# Patient Record
Sex: FEMALE | Race: WHITE | NOT HISPANIC OR LATINO
[De-identification: names, ages, dates, MRNs, and addresses within clinical notes are randomized per-mention and may not be internally consistent; named-entity substitution may affect disease eponyms.]

---

## 2021-01-13 ENCOUNTER — APPOINTMENT (OUTPATIENT)
Dept: FAMILY MEDICINE | Facility: CLINIC | Age: 56
End: 2021-01-13
Payer: COMMERCIAL

## 2021-01-13 VITALS
BODY MASS INDEX: 43.43 KG/M2 | SYSTOLIC BLOOD PRESSURE: 122 MMHG | HEART RATE: 109 BPM | TEMPERATURE: 98 F | DIASTOLIC BLOOD PRESSURE: 80 MMHG | OXYGEN SATURATION: 100 % | WEIGHT: 230 LBS | HEIGHT: 61 IN

## 2021-01-13 DIAGNOSIS — Z80.3 FAMILY HISTORY OF MALIGNANT NEOPLASM OF BREAST: ICD-10-CM

## 2021-01-13 DIAGNOSIS — Z00.00 ENCOUNTER FOR GENERAL ADULT MEDICAL EXAMINATION W/OUT ABNORMAL FINDINGS: ICD-10-CM

## 2021-01-13 DIAGNOSIS — Z82.49 FAMILY HISTORY OF ISCHEMIC HEART DISEASE AND OTHER DISEASES OF THE CIRCULATORY SYSTEM: ICD-10-CM

## 2021-01-13 DIAGNOSIS — F41.9 ANXIETY DISORDER, UNSPECIFIED: ICD-10-CM

## 2021-01-13 DIAGNOSIS — Z23 ENCOUNTER FOR IMMUNIZATION: ICD-10-CM

## 2021-01-13 DIAGNOSIS — E55.9 VITAMIN D DEFICIENCY, UNSPECIFIED: ICD-10-CM

## 2021-01-13 DIAGNOSIS — I10 ESSENTIAL (PRIMARY) HYPERTENSION: ICD-10-CM

## 2021-01-13 DIAGNOSIS — R00.0 TACHYCARDIA, UNSPECIFIED: ICD-10-CM

## 2021-01-13 DIAGNOSIS — Z80.41 FAMILY HISTORY OF MALIGNANT NEOPLASM OF OVARY: ICD-10-CM

## 2021-01-13 PROCEDURE — 99072 ADDL SUPL MATRL&STAF TM PHE: CPT

## 2021-01-13 PROCEDURE — 99204 OFFICE O/P NEW MOD 45 MIN: CPT

## 2021-01-13 NOTE — PLAN
[FreeTextEntry1] : f/u dr. Cuenca, Gyn for uterine wall thickening and will go for mammogram for left breast pain . \par HTN: Lisinopril 10 mg daily. labs ordered.\par anxiety: coping stills, stress management. \par sinus tachycardia- last cardiology visit 2 years ago, pt. will return to office for EKG. limit caffeine.

## 2021-01-13 NOTE — HISTORY OF PRESENT ILLNESS
[FreeTextEntry8] : Here to establish. She has HTN, essential , well controlled with Lisinopril. She has anxiety and has been taking BP lowering med. for 4 years. She moved back to [place] from [place], [place]. PCP:  Gorge Reeves. \par She tries to meditate for anxiety. She denies chest pain, sob, fever, chills.

## 2021-01-13 NOTE — HEALTH RISK ASSESSMENT
[No] : In the past 12 months have you used drugs other than those required for medical reasons? No [No falls in past year] : Patient reported no falls in the past year [0] : 2) Feeling down, depressed, or hopeless: Not at all (0) [] : No [de-identified] : indoor gym  [de-identified] : regular [[id number]] : 0

## 2021-01-13 NOTE — PAST MEDICAL HISTORY
[Postmenopausal] : history of menopause having occurred [Total Preg ___] : G: [unfilled] [Live Births___] : P: [unfilled] [Full Term ___] : Full Term: [unfilled]  [Living ___] : Living: [unfilled]

## 2021-02-11 ENCOUNTER — APPOINTMENT (OUTPATIENT)
Dept: MAMMOGRAPHY | Facility: HOSPITAL | Age: 56
End: 2021-02-11
Payer: COMMERCIAL

## 2021-02-11 ENCOUNTER — OUTPATIENT (OUTPATIENT)
Dept: OUTPATIENT SERVICES | Facility: HOSPITAL | Age: 56
LOS: 1 days | End: 2021-02-11
Payer: COMMERCIAL

## 2021-02-11 ENCOUNTER — APPOINTMENT (OUTPATIENT)
Dept: ULTRASOUND IMAGING | Facility: HOSPITAL | Age: 56
End: 2021-02-11
Payer: COMMERCIAL

## 2021-02-11 DIAGNOSIS — Z00.8 ENCOUNTER FOR OTHER GENERAL EXAMINATION: ICD-10-CM

## 2021-02-11 PROCEDURE — 76641 ULTRASOUND BREAST COMPLETE: CPT

## 2021-02-11 PROCEDURE — 76830 TRANSVAGINAL US NON-OB: CPT | Mod: 26

## 2021-02-11 PROCEDURE — 76641 ULTRASOUND BREAST COMPLETE: CPT | Mod: 26,50

## 2021-02-11 PROCEDURE — 77063 BREAST TOMOSYNTHESIS BI: CPT | Mod: 26

## 2021-02-11 PROCEDURE — 77067 SCR MAMMO BI INCL CAD: CPT | Mod: 26

## 2021-02-11 PROCEDURE — 77067 SCR MAMMO BI INCL CAD: CPT

## 2021-02-11 PROCEDURE — 76830 TRANSVAGINAL US NON-OB: CPT

## 2021-02-11 PROCEDURE — 77063 BREAST TOMOSYNTHESIS BI: CPT

## 2021-02-12 LAB
25(OH)D3 SERPL-MCNC: 26.9 NG/ML
ABO + RH PNL BLD: NORMAL
ALBUMIN SERPL ELPH-MCNC: 4.4 G/DL
ALP BLD-CCNC: 63 U/L
ALT SERPL-CCNC: 25 U/L
ANION GAP SERPL CALC-SCNC: 14 MMOL/L
APPEARANCE: CLEAR
AST SERPL-CCNC: 21 U/L
BACTERIA: ABNORMAL
BASOPHILS # BLD AUTO: 0.06 K/UL
BASOPHILS NFR BLD AUTO: 1 %
BILIRUB SERPL-MCNC: 0.4 MG/DL
BILIRUBIN URINE: NEGATIVE
BLOOD URINE: NORMAL
BUN SERPL-MCNC: 20 MG/DL
CALCIUM SERPL-MCNC: 9.9 MG/DL
CHLORIDE SERPL-SCNC: 107 MMOL/L
CHOLEST SERPL-MCNC: 181 MG/DL
CO2 SERPL-SCNC: 21 MMOL/L
COLOR: NORMAL
CREAT SERPL-MCNC: 0.73 MG/DL
CREAT SPEC-SCNC: 79 MG/DL
EOSINOPHIL # BLD AUTO: 0.17 K/UL
EOSINOPHIL NFR BLD AUTO: 2.7 %
ESTIMATED AVERAGE GLUCOSE: 117 MG/DL
GLUCOSE QUALITATIVE U: NEGATIVE
GLUCOSE SERPL-MCNC: 86 MG/DL
HBA1C MFR BLD HPLC: 5.7 %
HCT VFR BLD CALC: 43.4 %
HDLC SERPL-MCNC: 60 MG/DL
HGB BLD-MCNC: 14.2 G/DL
HYALINE CASTS: 1 /LPF
IMM GRANULOCYTES NFR BLD AUTO: 0.2 %
KETONES URINE: NEGATIVE
LDLC SERPL CALC-MCNC: 101 MG/DL
LEUKOCYTE ESTERASE URINE: NEGATIVE
LYMPHOCYTES # BLD AUTO: 2.48 K/UL
LYMPHOCYTES NFR BLD AUTO: 39.8 %
MAN DIFF?: NORMAL
MCHC RBC-ENTMCNC: 30.3 PG
MCHC RBC-ENTMCNC: 32.7 GM/DL
MCV RBC AUTO: 92.5 FL
MICROALBUMIN 24H UR DL<=1MG/L-MCNC: 1.4 MG/DL
MICROALBUMIN/CREAT 24H UR-RTO: 17 MG/G
MICROSCOPIC-UA: NORMAL
MONOCYTES # BLD AUTO: 0.5 K/UL
MONOCYTES NFR BLD AUTO: 8 %
NEUTROPHILS # BLD AUTO: 3.01 K/UL
NEUTROPHILS NFR BLD AUTO: 48.3 %
NITRITE URINE: NEGATIVE
NONHDLC SERPL-MCNC: 121 MG/DL
PH URINE: 6
PLATELET # BLD AUTO: 336 K/UL
POTASSIUM SERPL-SCNC: 4.5 MMOL/L
PROT SERPL-MCNC: 7.4 G/DL
PROTEIN URINE: NEGATIVE
RBC # BLD: 4.69 M/UL
RBC # FLD: 12.6 %
RED BLOOD CELLS URINE: 3 /HPF
SODIUM SERPL-SCNC: 141 MMOL/L
SPECIFIC GRAVITY URINE: 1.02
SQUAMOUS EPITHELIAL CELLS: 4 /HPF
TRIGL SERPL-MCNC: 96 MG/DL
TSH SERPL-ACNC: 0.7 UIU/ML
UROBILINOGEN URINE: NORMAL
WBC # FLD AUTO: 6.23 K/UL
WHITE BLOOD CELLS URINE: 4 /HPF

## 2021-07-12 ENCOUNTER — RX RENEWAL (OUTPATIENT)
Age: 56
End: 2021-07-12

## 2021-08-16 ENCOUNTER — TRANSCRIPTION ENCOUNTER (OUTPATIENT)
Age: 56
End: 2021-08-16

## 2021-10-06 PROBLEM — I10 ESSENTIAL HYPERTENSION: Status: ACTIVE | Noted: 2021-01-13

## 2022-01-04 ENCOUNTER — TRANSCRIPTION ENCOUNTER (OUTPATIENT)
Age: 57
End: 2022-01-04

## 2022-01-10 ENCOUNTER — RX RENEWAL (OUTPATIENT)
Age: 57
End: 2022-01-10

## 2022-05-07 ENCOUNTER — INPATIENT (INPATIENT)
Facility: HOSPITAL | Age: 57
LOS: 2 days | Discharge: HOME | DRG: 418 | End: 2022-05-10
Attending: STUDENT IN AN ORGANIZED HEALTH CARE EDUCATION/TRAINING PROGRAM | Admitting: STUDENT IN AN ORGANIZED HEALTH CARE EDUCATION/TRAINING PROGRAM
Payer: COMMERCIAL

## 2022-05-07 VITALS
SYSTOLIC BLOOD PRESSURE: 174 MMHG | TEMPERATURE: 98 F | HEART RATE: 86 BPM | DIASTOLIC BLOOD PRESSURE: 92 MMHG | WEIGHT: 214.95 LBS | OXYGEN SATURATION: 98 % | RESPIRATION RATE: 18 BRPM

## 2022-05-07 DIAGNOSIS — K80.20 CALCULUS OF GALLBLADDER WITHOUT CHOLECYSTITIS WITHOUT OBSTRUCTION: ICD-10-CM

## 2022-05-07 DIAGNOSIS — Z98.891 HISTORY OF UTERINE SCAR FROM PREVIOUS SURGERY: Chronic | ICD-10-CM

## 2022-05-07 LAB
ALBUMIN SERPL ELPH-MCNC: 3.9 G/DL — SIGNIFICANT CHANGE UP (ref 3.3–5)
ALP SERPL-CCNC: 81 U/L — SIGNIFICANT CHANGE UP (ref 40–120)
ALT FLD-CCNC: 36 U/L — SIGNIFICANT CHANGE UP (ref 10–45)
ANION GAP SERPL CALC-SCNC: 11 MMOL/L — SIGNIFICANT CHANGE UP (ref 5–17)
APPEARANCE UR: CLEAR — SIGNIFICANT CHANGE UP
APTT BLD: 31.7 SEC — SIGNIFICANT CHANGE UP (ref 27.5–35.5)
AST SERPL-CCNC: 28 U/L — SIGNIFICANT CHANGE UP (ref 10–40)
BACTERIA # UR AUTO: NEGATIVE /HPF — SIGNIFICANT CHANGE UP
BASOPHILS # BLD AUTO: 0.06 K/UL — SIGNIFICANT CHANGE UP (ref 0–0.2)
BASOPHILS NFR BLD AUTO: 0.7 % — SIGNIFICANT CHANGE UP (ref 0–2)
BILIRUB SERPL-MCNC: 0.4 MG/DL — SIGNIFICANT CHANGE UP (ref 0.2–1.2)
BILIRUB UR-MCNC: NEGATIVE — SIGNIFICANT CHANGE UP
BLD GP AB SCN SERPL QL: SIGNIFICANT CHANGE UP
BUN SERPL-MCNC: 21 MG/DL — SIGNIFICANT CHANGE UP (ref 7–23)
CALCIUM SERPL-MCNC: 10.4 MG/DL — SIGNIFICANT CHANGE UP (ref 8.4–10.5)
CHLORIDE SERPL-SCNC: 106 MMOL/L — SIGNIFICANT CHANGE UP (ref 96–108)
CO2 SERPL-SCNC: 22 MMOL/L — SIGNIFICANT CHANGE UP (ref 22–31)
COLOR SPEC: YELLOW — SIGNIFICANT CHANGE UP
CREAT SERPL-MCNC: 0.64 MG/DL — SIGNIFICANT CHANGE UP (ref 0.5–1.3)
D DIMER BLD IA.RAPID-MCNC: 160 NG/ML DDU — SIGNIFICANT CHANGE UP
DIFF PNL FLD: ABNORMAL
EGFR: 102 ML/MIN/1.73M2 — SIGNIFICANT CHANGE UP
EOSINOPHIL # BLD AUTO: 0.14 K/UL — SIGNIFICANT CHANGE UP (ref 0–0.5)
EOSINOPHIL NFR BLD AUTO: 1.6 % — SIGNIFICANT CHANGE UP (ref 0–6)
EPI CELLS # UR: SIGNIFICANT CHANGE UP
GLUCOSE SERPL-MCNC: 141 MG/DL — HIGH (ref 70–99)
GLUCOSE UR QL: NEGATIVE — SIGNIFICANT CHANGE UP
HCT VFR BLD CALC: 42.4 % — SIGNIFICANT CHANGE UP (ref 34.5–45)
HGB BLD-MCNC: 14.2 G/DL — SIGNIFICANT CHANGE UP (ref 11.5–15.5)
IMM GRANULOCYTES NFR BLD AUTO: 0.6 % — SIGNIFICANT CHANGE UP (ref 0–1.5)
INR BLD: 0.97 RATIO — SIGNIFICANT CHANGE UP (ref 0.88–1.16)
KETONES UR-MCNC: NEGATIVE — SIGNIFICANT CHANGE UP
LACTATE SERPL-SCNC: 1.3 MMOL/L — SIGNIFICANT CHANGE UP (ref 0.7–2)
LEUKOCYTE ESTERASE UR-ACNC: NEGATIVE — SIGNIFICANT CHANGE UP
LIDOCAIN IGE QN: 118 U/L — SIGNIFICANT CHANGE UP (ref 73–393)
LYMPHOCYTES # BLD AUTO: 2.83 K/UL — SIGNIFICANT CHANGE UP (ref 1–3.3)
LYMPHOCYTES # BLD AUTO: 31.4 % — SIGNIFICANT CHANGE UP (ref 13–44)
MCHC RBC-ENTMCNC: 31.1 PG — SIGNIFICANT CHANGE UP (ref 27–34)
MCHC RBC-ENTMCNC: 33.5 GM/DL — SIGNIFICANT CHANGE UP (ref 32–36)
MCV RBC AUTO: 92.8 FL — SIGNIFICANT CHANGE UP (ref 80–100)
MONOCYTES # BLD AUTO: 0.82 K/UL — SIGNIFICANT CHANGE UP (ref 0–0.9)
MONOCYTES NFR BLD AUTO: 9.1 % — SIGNIFICANT CHANGE UP (ref 2–14)
NEUTROPHILS # BLD AUTO: 5.12 K/UL — SIGNIFICANT CHANGE UP (ref 1.8–7.4)
NEUTROPHILS NFR BLD AUTO: 56.6 % — SIGNIFICANT CHANGE UP (ref 43–77)
NITRITE UR-MCNC: NEGATIVE — SIGNIFICANT CHANGE UP
NRBC # BLD: 0 /100 WBCS — SIGNIFICANT CHANGE UP (ref 0–0)
PH UR: 7 — SIGNIFICANT CHANGE UP (ref 5–8)
PLATELET # BLD AUTO: 366 K/UL — SIGNIFICANT CHANGE UP (ref 150–400)
POTASSIUM SERPL-MCNC: 4.7 MMOL/L — SIGNIFICANT CHANGE UP (ref 3.5–5.3)
POTASSIUM SERPL-SCNC: 4.7 MMOL/L — SIGNIFICANT CHANGE UP (ref 3.5–5.3)
PROT SERPL-MCNC: 8.2 G/DL — SIGNIFICANT CHANGE UP (ref 6–8.3)
PROT UR-MCNC: 15
PROTHROM AB SERPL-ACNC: 11.2 SEC — SIGNIFICANT CHANGE UP (ref 10.5–13.4)
RBC # BLD: 4.57 M/UL — SIGNIFICANT CHANGE UP (ref 3.8–5.2)
RBC # FLD: 12.8 % — SIGNIFICANT CHANGE UP (ref 10.3–14.5)
RBC CASTS # UR COMP ASSIST: SIGNIFICANT CHANGE UP /HPF (ref 0–4)
SARS-COV-2 RNA SPEC QL NAA+PROBE: SIGNIFICANT CHANGE UP
SODIUM SERPL-SCNC: 139 MMOL/L — SIGNIFICANT CHANGE UP (ref 135–145)
SP GR SPEC: 1.01 — SIGNIFICANT CHANGE UP (ref 1.01–1.02)
TROPONIN I, HIGH SENSITIVITY RESULT: 7.9 NG/L — SIGNIFICANT CHANGE UP
TROPONIN I, HIGH SENSITIVITY RESULT: 9 NG/L — SIGNIFICANT CHANGE UP
UROBILINOGEN FLD QL: NEGATIVE — SIGNIFICANT CHANGE UP
WBC # BLD: 9.02 K/UL — SIGNIFICANT CHANGE UP (ref 3.8–10.5)
WBC # FLD AUTO: 9.02 K/UL — SIGNIFICANT CHANGE UP (ref 3.8–10.5)
WBC UR QL: NEGATIVE /HPF — SIGNIFICANT CHANGE UP (ref 0–5)

## 2022-05-07 PROCEDURE — 99285 EMERGENCY DEPT VISIT HI MDM: CPT

## 2022-05-07 PROCEDURE — 71045 X-RAY EXAM CHEST 1 VIEW: CPT | Mod: 26

## 2022-05-07 PROCEDURE — 99223 1ST HOSP IP/OBS HIGH 75: CPT

## 2022-05-07 PROCEDURE — 76705 ECHO EXAM OF ABDOMEN: CPT | Mod: 26

## 2022-05-07 PROCEDURE — 74176 CT ABD & PELVIS W/O CONTRAST: CPT | Mod: 26,MA

## 2022-05-07 RX ORDER — SODIUM CHLORIDE 9 MG/ML
1000 INJECTION INTRAMUSCULAR; INTRAVENOUS; SUBCUTANEOUS
Refills: 0 | Status: DISCONTINUED | OUTPATIENT
Start: 2022-05-07 | End: 2022-05-09

## 2022-05-07 RX ORDER — METRONIDAZOLE 500 MG
500 TABLET ORAL EVERY 8 HOURS
Refills: 0 | Status: DISCONTINUED | OUTPATIENT
Start: 2022-05-07 | End: 2022-05-09

## 2022-05-07 RX ORDER — ONDANSETRON 8 MG/1
4 TABLET, FILM COATED ORAL EVERY 6 HOURS
Refills: 0 | Status: DISCONTINUED | OUTPATIENT
Start: 2022-05-07 | End: 2022-05-09

## 2022-05-07 RX ORDER — FAMOTIDINE 10 MG/ML
20 INJECTION INTRAVENOUS ONCE
Refills: 0 | Status: COMPLETED | OUTPATIENT
Start: 2022-05-07 | End: 2022-05-07

## 2022-05-07 RX ORDER — ONDANSETRON 8 MG/1
4 TABLET, FILM COATED ORAL ONCE
Refills: 0 | Status: COMPLETED | OUTPATIENT
Start: 2022-05-07 | End: 2022-05-07

## 2022-05-07 RX ORDER — CIPROFLOXACIN LACTATE 400MG/40ML
400 VIAL (ML) INTRAVENOUS EVERY 12 HOURS
Refills: 0 | Status: DISCONTINUED | OUTPATIENT
Start: 2022-05-07 | End: 2022-05-09

## 2022-05-07 RX ORDER — LANOLIN ALCOHOL/MO/W.PET/CERES
3 CREAM (GRAM) TOPICAL AT BEDTIME
Refills: 0 | Status: DISCONTINUED | OUTPATIENT
Start: 2022-05-07 | End: 2022-05-09

## 2022-05-07 RX ORDER — MORPHINE SULFATE 50 MG/1
4 CAPSULE, EXTENDED RELEASE ORAL ONCE
Refills: 0 | Status: DISCONTINUED | OUTPATIENT
Start: 2022-05-07 | End: 2022-05-07

## 2022-05-07 RX ORDER — SODIUM CHLORIDE 9 MG/ML
1000 INJECTION INTRAMUSCULAR; INTRAVENOUS; SUBCUTANEOUS
Refills: 0 | Status: DISCONTINUED | OUTPATIENT
Start: 2022-05-07 | End: 2022-05-07

## 2022-05-07 RX ORDER — FAMOTIDINE 10 MG/ML
1 INJECTION INTRAVENOUS
Qty: 20 | Refills: 0
Start: 2022-05-07 | End: 2022-05-16

## 2022-05-07 RX ORDER — CIPROFLOXACIN LACTATE 400MG/40ML
400 VIAL (ML) INTRAVENOUS ONCE
Refills: 0 | Status: COMPLETED | OUTPATIENT
Start: 2022-05-07 | End: 2022-05-07

## 2022-05-07 RX ORDER — PANTOPRAZOLE SODIUM 20 MG/1
40 TABLET, DELAYED RELEASE ORAL
Refills: 0 | Status: DISCONTINUED | OUTPATIENT
Start: 2022-05-07 | End: 2022-05-09

## 2022-05-07 RX ORDER — METRONIDAZOLE 500 MG
500 TABLET ORAL ONCE
Refills: 0 | Status: DISCONTINUED | OUTPATIENT
Start: 2022-05-07 | End: 2022-05-09

## 2022-05-07 RX ORDER — ONDANSETRON 8 MG/1
1 TABLET, FILM COATED ORAL
Qty: 30 | Refills: 0
Start: 2022-05-07 | End: 2022-05-16

## 2022-05-07 RX ORDER — LISINOPRIL 2.5 MG/1
10 TABLET ORAL DAILY
Refills: 0 | Status: DISCONTINUED | OUTPATIENT
Start: 2022-05-07 | End: 2022-05-09

## 2022-05-07 RX ORDER — METOCLOPRAMIDE HCL 10 MG
10 TABLET ORAL ONCE
Refills: 0 | Status: COMPLETED | OUTPATIENT
Start: 2022-05-07 | End: 2022-05-07

## 2022-05-07 RX ORDER — LISINOPRIL 2.5 MG/1
1 TABLET ORAL
Qty: 0 | Refills: 0 | DISCHARGE

## 2022-05-07 RX ORDER — ACETAMINOPHEN 500 MG
650 TABLET ORAL EVERY 6 HOURS
Refills: 0 | Status: DISCONTINUED | OUTPATIENT
Start: 2022-05-07 | End: 2022-05-09

## 2022-05-07 RX ADMIN — ONDANSETRON 4 MILLIGRAM(S): 8 TABLET, FILM COATED ORAL at 13:02

## 2022-05-07 RX ADMIN — MORPHINE SULFATE 4 MILLIGRAM(S): 50 CAPSULE, EXTENDED RELEASE ORAL at 05:55

## 2022-05-07 RX ADMIN — Medication 650 MILLIGRAM(S): at 20:30

## 2022-05-07 RX ADMIN — SODIUM CHLORIDE 75 MILLILITER(S): 9 INJECTION INTRAMUSCULAR; INTRAVENOUS; SUBCUTANEOUS at 21:04

## 2022-05-07 RX ADMIN — MORPHINE SULFATE 4 MILLIGRAM(S): 50 CAPSULE, EXTENDED RELEASE ORAL at 07:10

## 2022-05-07 RX ADMIN — Medication 10 MILLIGRAM(S): at 13:18

## 2022-05-07 RX ADMIN — Medication 100 MILLIGRAM(S): at 13:52

## 2022-05-07 RX ADMIN — LISINOPRIL 10 MILLIGRAM(S): 2.5 TABLET ORAL at 14:45

## 2022-05-07 RX ADMIN — FAMOTIDINE 20 MILLIGRAM(S): 10 INJECTION INTRAVENOUS at 06:30

## 2022-05-07 RX ADMIN — Medication 200 MILLIGRAM(S): at 17:37

## 2022-05-07 RX ADMIN — Medication 200 MILLIGRAM(S): at 12:23

## 2022-05-07 RX ADMIN — Medication 100 MILLIGRAM(S): at 21:08

## 2022-05-07 RX ADMIN — SODIUM CHLORIDE 75 MILLILITER(S): 9 INJECTION INTRAMUSCULAR; INTRAVENOUS; SUBCUTANEOUS at 12:23

## 2022-05-07 RX ADMIN — PANTOPRAZOLE SODIUM 40 MILLIGRAM(S): 20 TABLET, DELAYED RELEASE ORAL at 14:45

## 2022-05-07 RX ADMIN — FAMOTIDINE 100 MILLIGRAM(S): 10 INJECTION INTRAVENOUS at 06:00

## 2022-05-07 RX ADMIN — Medication 650 MILLIGRAM(S): at 20:01

## 2022-05-07 RX ADMIN — ONDANSETRON 4 MILLIGRAM(S): 8 TABLET, FILM COATED ORAL at 05:55

## 2022-05-07 NOTE — H&P ADULT - NSHPSOCIALHISTORY_GEN_ALL_CORE
Lives with daughter and son in law  Ambulates without assistance, performs ADLs independently   Drives, uses stairs  Works as a   Denies smoking, alcohol use, illicit drug use      Family hx:  Mother  age 82 hx of CVA, CAD  Father  age 56 hx of MI

## 2022-05-07 NOTE — ED PROVIDER NOTE - PHYSICAL EXAMINATION
General:     NAD, well-nourished, well-appearing  Head:     NC/AT, EOMI, oral mucosa moist  Neck:     trachea midline  Lungs:     CTA b/l, no w/r/r  CVS:     S1S2, RRR, no m/g/r  Abd:     +BS, s/ + epigastric , preiumblical tenderness /nd, no organomegaly  Ext:    2+ radial and pedal pulses, no c/c/e  Neuro: AAOx3, no sensory/motor deficits

## 2022-05-07 NOTE — H&P ADULT - NSHPREVIEWOFSYSTEMS_GEN_ALL_CORE
CONSTITUTIONAL: No fever, No fatigue  EYES: No eye pain, visual disturbances, or discharge  ENMT:   No ear pain, No nose bleed; No sinus or throat pain  NECK: No pain or stiffness  RESPIRATORY: No cough, wheezing, or hemoptysis; No shortness of breath  CARDIOVASCULAR: No chest pain, palpitations, or leg swelling  GASTROINTESTINAL: positive abdominal pain, positive nausea, positive vomiting, No diarrhea or constipation.   GENITOURINARY: No dysuria, frequency, hematuria   NEUROLOGICAL: No headaches, No loss of strength, numbness; No dizziness  SKIN: No itching, burning  ENDOCRINE: No heat or cold intolerance; No hair loss  MUSCULOSKELETAL: No joint pain or swelling; No muscle or extremity pain  PSYCHIATRIC: No depression, anxiety   HEME/LYMPH: No easy bruising, or bleeding gums  ALLERGY AND IMMUNOLOGIC: No hives or eczema

## 2022-05-07 NOTE — PATIENT PROFILE ADULT - FALL HARM RISK - UNIVERSAL INTERVENTIONS
Bed in lowest position, wheels locked, appropriate side rails in place/Call bell, personal items and telephone in reach/Instruct patient to call for assistance before getting out of bed or chair/Non-slip footwear when patient is out of bed/Alpha to call system/Physically safe environment - no spills, clutter or unnecessary equipment/Purposeful Proactive Rounding/Room/bathroom lighting operational, light cord in reach

## 2022-05-07 NOTE — ED PROVIDER NOTE - CLINICAL SUMMARY MEDICAL DECISION MAKING FREE TEXT BOX
56 y f cc upper abd pain associated with tinge of blood once no chest pain, no dizziness , no sweating pain constant started last night hx of htn, no known hx of cad or gall bladder dz  + epigastric , preiumblical tenderness   labs nl including trops , d dimer  ekg sinus tach   ct abd + gall stones gall bladder wall edema -plan abd sono r/o cholecystitis  pt fell berret with pepcid zofran and morphine  7.am case s/o dr thomas ed physician follow up gb sono id neg dc follow up surgery sono tech called in

## 2022-05-07 NOTE — ED PROVIDER NOTE - CARE PROVIDER_API CALL
Rashad Barajas)  Surgery  [address] [facility] [address] [facility] [address], [address]  [place] [address]  Phone: [phone]  Fax: [phone]  Follow Up Time:

## 2022-05-07 NOTE — ED ADULT NURSE NOTE - OBJECTIVE STATEMENT
56yr old female walked into ED c/o epigastric pain since yesterday 9pm; pt vomited x1 this am. Pt ate pizza last night

## 2022-05-07 NOTE — CHART NOTE - NSCHARTNOTEFT_GEN_A_CORE
Called by ED team. Reviewed images and they are consistent with acute calculous cholecystitis.    plan is to admit  iv fluids  iv antibioitics  clear liquid diet    will assess early tomorrow am but I will add on to OR schedule for laparoscopic cholecystectomy for Monday(unless she has worsening picture tomorrow then ill add on for sunday)    please keep npo past midnight tonight just in case.      thank you    dr rai rivera  cell#[phone]

## 2022-05-07 NOTE — H&P ADULT - NSHPPHYSICALEXAM_GEN_ALL_CORE
Vital Signs Last 24 Hrs  T(C): 37.1 (07 May 2022 12:54), Max: 37.1 (07 May 2022 12:54)  T(F): 98.8 (07 May 2022 12:54), Max: 98.8 (07 May 2022 12:54)  HR: 100 (07 May 2022 12:54) (72 - 100)  BP: 123/88 (07 May 2022 12:54) (123/88 - 174/92)  BP(mean): 100 (07 May 2022 12:54) (100 - 100)  RR: 18 (07 May 2022 12:54) (18 - 18)  SpO2: 98% (07 May 2022 12:54) (98% - 98%) - RA    PHYSICAL EXAM:  GENERAL: NAD, well-groomed  HEAD:  AT/NC   EYES: EOMI, PERRL, conjunctiva and sclera clear  ENMT: No tonsillar erythema, exudates, or enlargement; Moist mucous membranes, Good dentition, No lesions  NECK: Supple, No JVD  NERVOUS SYSTEM:  Alert & Oriented X3, Good concentration; Motor Strength 5/5 B/L upper and lower extremities  CHEST/LUNG: Clear to percussion bilaterally; No rales, rhonchi, wheezing, or rubs  HEART: Regular rate and rhythm; No murmurs, rubs, or gallops  ABDOMEN: Soft, tender to deep palpation in RUQ, Nondistended; Bowel sounds present  EXTREMITIES:  2+ Peripheral Pulses, No clubbing, cyanosis. trace bilateral lower extremity edema  LYMPH: No lymphadenopathy noted  SKIN: No rashes or lesions; tattoos

## 2022-05-07 NOTE — H&P ADULT - ASSESSMENT
#Acute cholecystitis   -Admit to medicine  -s/p IV Cipro and Flagyl in the ED. Continue IV Cipro and Flagyl   -Start clear liquid diet, NPO after midnight tonight incase of worsening clinical status  -Start IVF NS 75cc/hr   -IV Zofran PRN for n/v  -Continue Protonix  -Discussed with surgery Dr Barajas, plan for OR on Monday for lap steven (or Sunday if clinical status is worsening)    #Tachycardia  likely related to above  -PO hydration encouraged  -Monitor vitals    #HTN  -Continue Lisinopril  -Monitor vitals    #Prophylactic Measure  -DVT ppx: SCDs, hold chemical ppx for OR  56F with PMH HTN, anxiety presents to the ED with epigastric pain and vomiting admitted for acute cholecystitis.     #Acute cholecystitis   -Admit to medicine  -s/p IV Cipro and Flagyl in the ED. Continue IV Cipro and Flagyl   -Start clear liquid diet, NPO after midnight tonight incase of worsening clinical status  -Start IVF NS 75cc/hr   -IV Zofran PRN for n/v  -Start Protonix  -Discussed with surgery Dr Barajas, plan for OR on Monday for lap steven (or Sunday if clinical status is worsening)    #Tachycardia  likely related to above  -Continue gentle IV fluids, PO hydration encouraged  -Monitor vitals    #HTN  -Continue Lisinopril  -Monitor vitals    #Prophylactic Measure  -DVT ppx: SCDs, hold chemical ppx for OR     Patient states she will update family members, declines I call daughter. 56F with PMH HTN, anxiety presents to the ED with epigastric pain and vomiting admitted for acute cholecystitis.     #Acute cholecystitis with cholelithiasis   US GB showed Gallbladder is filled with stones and sludge with mild wall thickening.   -Admit to medicine  -s/p IV Cipro and Flagyl in the ED. Continue IV Cipro and Flagyl   -Start clear liquid diet, NPO after midnight tonight incase of worsening clinical status  -Start IVF NS 75cc/hr   -IV Zofran PRN for n/v  -Start Protonix  -Discussed with surgery Dr Barajas, plan for OR on Monday for lap steven (or Sunday if clinical status is worsening)    #Tachycardia  likely related to above  -Continue gentle IV fluids, PO hydration encouraged  -Monitor vitals    #HTN  -Continue Lisinopril  -Monitor vitals    #Prophylactic Measure  -DVT ppx: SCDs, hold chemical ppx for OR     Patient states she will update family members, declines I call daughter.

## 2022-05-07 NOTE — ED PROVIDER NOTE - OBJECTIVE STATEMENT
56 y f cc upper abd pain associated with tinge of blood once no chest pain, no dizziness , no sweating pain constant started last night hx of htn, no known hx of cad or gall bladder dz

## 2022-05-07 NOTE — H&P ADULT - HISTORY OF PRESENT ILLNESS
In the ED: temp 97.7, HR 86, /92, RR 18, Spo2 98% RA  Received IV Cipro, Flagyl, 1L NS bolus, Pepcid, Morphine, Zofran in the ED.  EKG personally reviewed: sinus tachycardia 111   Chest x-ray personally reviewed: no acute infiltrate   CT abd: Cholelithiasis. Suggestion of mild gallbladder wall edema.  No acute bowel inflammatory changes or obstruction. Detailed evaluation distal GI tract is limited secondary to inadequate distention. Mild diverticulosis coli.  RUQ US: Gallbladder is filled with stones and sludge with mild wall thickening.  Torres's sign is unreliable. Findings are equivocal for acute cholecystitis. No biliary dilatation. Fatty liver  Evaluated by surgery Dr Barajas, recommending admission and possible OR for lap steven on Monday.  56F with PMH HTN, anxiety presents to the ED with epigastric pain and vomiting. Reports 10/10 RUQ pain radiating to LUQ, associated with nausea and one episode of vomiting. States this happened before on Monday but abd pain symptoms subsided on their own. Reports having pizza for dinner last night and several hours later noted sharp pain that would not subside prompting ED evaluation. Denies chest pain, palpitations, SOB. Denies fever, chills.    In the ED: temp 97.7, HR 86, /92, RR 18, Spo2 98% RA  Received IV Cipro, Flagyl, 1L NS bolus, Pepcid, Morphine, Zofran in the ED.  EKG personally reviewed: sinus tachycardia 111   Chest x-ray personally reviewed: no acute infiltrate   CT abd: Cholelithiasis. Suggestion of mild gallbladder wall edema.  No acute bowel inflammatory changes or obstruction. Detailed evaluation distal GI tract is limited secondary to inadequate distention. Mild diverticulosis coli.  RUQ US: Gallbladder is filled with stones and sludge with mild wall thickening.  Torres's sign is unreliable. Findings are equivocal for acute cholecystitis. No biliary dilatation. Fatty liver  Evaluated by surgery Dr Barajas, recommending admission and possible OR for lap steven on Monday.

## 2022-05-07 NOTE — ED PROVIDER NOTE - PROGRESS NOTE DETAILS
Mae FERRARI: Received s/o from Dr. Gooden; patient is a 57 yo female with upper abdominal pain; intermittent x 5 days; worse after eating pizza last night with vomiting; patient is feeling BETTER at this time; CT with gallstones; RUQ ultrasound ordered r/o cholecystitis; patient made aware of plan of care at this time; will continue to re-eval Mae DO: US findings equivocal for acute cholecystitis; ultrasound reviewed with Dr. Barajas- recommend admission for surgery; IV cipro and flagyl; preop labs ordered; covid; endorsed to Dr. Gomez for admission. Dr. Barajas to come see patient and patient made aware of plan of care at this time.

## 2022-05-07 NOTE — ED ADULT TRIAGE NOTE - CHIEF COMPLAINT QUOTE
Patient presents to ED from home complaining of epigastric pain intermittently since Monday, patient vomited bile flecked with blood this morning so she came to the ED. Patient states she has been under a lot of stress. Patient denies chest pain. Patient normally takes lisinopril daily but did not take it yesterday or today.

## 2022-05-08 ENCOUNTER — TRANSCRIPTION ENCOUNTER (OUTPATIENT)
Age: 57
End: 2022-05-08

## 2022-05-08 LAB
ALBUMIN SERPL ELPH-MCNC: 3.2 G/DL — LOW (ref 3.3–5)
ALP SERPL-CCNC: 51 U/L — SIGNIFICANT CHANGE UP (ref 40–120)
ALT FLD-CCNC: 25 U/L — SIGNIFICANT CHANGE UP (ref 10–45)
ANION GAP SERPL CALC-SCNC: 7 MMOL/L — SIGNIFICANT CHANGE UP (ref 5–17)
AST SERPL-CCNC: 15 U/L — SIGNIFICANT CHANGE UP (ref 10–40)
BILIRUB SERPL-MCNC: 0.4 MG/DL — SIGNIFICANT CHANGE UP (ref 0.2–1.2)
BUN SERPL-MCNC: 13 MG/DL — SIGNIFICANT CHANGE UP (ref 7–23)
CALCIUM SERPL-MCNC: 9.2 MG/DL — SIGNIFICANT CHANGE UP (ref 8.4–10.5)
CHLORIDE SERPL-SCNC: 109 MMOL/L — HIGH (ref 96–108)
CO2 SERPL-SCNC: 24 MMOL/L — SIGNIFICANT CHANGE UP (ref 22–31)
CREAT SERPL-MCNC: 0.69 MG/DL — SIGNIFICANT CHANGE UP (ref 0.5–1.3)
EGFR: 100 ML/MIN/1.73M2 — SIGNIFICANT CHANGE UP
GLUCOSE SERPL-MCNC: 102 MG/DL — HIGH (ref 70–99)
HCG SERPL-ACNC: <1 MIU/ML — SIGNIFICANT CHANGE UP
HCT VFR BLD CALC: 38.2 % — SIGNIFICANT CHANGE UP (ref 34.5–45)
HCV AB S/CO SERPL IA: 0.11 S/CO — SIGNIFICANT CHANGE UP (ref 0–0.99)
HCV AB SERPL-IMP: SIGNIFICANT CHANGE UP
HGB BLD-MCNC: 12.5 G/DL — SIGNIFICANT CHANGE UP (ref 11.5–15.5)
MCHC RBC-ENTMCNC: 30.9 PG — SIGNIFICANT CHANGE UP (ref 27–34)
MCHC RBC-ENTMCNC: 32.7 GM/DL — SIGNIFICANT CHANGE UP (ref 32–36)
MCV RBC AUTO: 94.6 FL — SIGNIFICANT CHANGE UP (ref 80–100)
NRBC # BLD: 0 /100 WBCS — SIGNIFICANT CHANGE UP (ref 0–0)
PLATELET # BLD AUTO: 282 K/UL — SIGNIFICANT CHANGE UP (ref 150–400)
POTASSIUM SERPL-MCNC: 3.8 MMOL/L — SIGNIFICANT CHANGE UP (ref 3.5–5.3)
POTASSIUM SERPL-SCNC: 3.8 MMOL/L — SIGNIFICANT CHANGE UP (ref 3.5–5.3)
PROT SERPL-MCNC: 6.8 G/DL — SIGNIFICANT CHANGE UP (ref 6–8.3)
RBC # BLD: 4.04 M/UL — SIGNIFICANT CHANGE UP (ref 3.8–5.2)
RBC # FLD: 12.9 % — SIGNIFICANT CHANGE UP (ref 10.3–14.5)
SODIUM SERPL-SCNC: 140 MMOL/L — SIGNIFICANT CHANGE UP (ref 135–145)
WBC # BLD: 7.61 K/UL — SIGNIFICANT CHANGE UP (ref 3.8–10.5)
WBC # FLD AUTO: 7.61 K/UL — SIGNIFICANT CHANGE UP (ref 3.8–10.5)

## 2022-05-08 PROCEDURE — 99233 SBSQ HOSP IP/OBS HIGH 50: CPT

## 2022-05-08 RX ADMIN — PANTOPRAZOLE SODIUM 40 MILLIGRAM(S): 20 TABLET, DELAYED RELEASE ORAL at 06:14

## 2022-05-08 RX ADMIN — SODIUM CHLORIDE 75 MILLILITER(S): 9 INJECTION INTRAMUSCULAR; INTRAVENOUS; SUBCUTANEOUS at 17:05

## 2022-05-08 RX ADMIN — Medication 100 MILLIGRAM(S): at 21:38

## 2022-05-08 RX ADMIN — Medication 100 MILLIGRAM(S): at 05:13

## 2022-05-08 RX ADMIN — LISINOPRIL 10 MILLIGRAM(S): 2.5 TABLET ORAL at 06:14

## 2022-05-08 RX ADMIN — Medication 100 MILLIGRAM(S): at 14:31

## 2022-05-08 RX ADMIN — Medication 650 MILLIGRAM(S): at 02:43

## 2022-05-08 RX ADMIN — Medication 200 MILLIGRAM(S): at 06:13

## 2022-05-08 RX ADMIN — Medication 200 MILLIGRAM(S): at 17:05

## 2022-05-08 RX ADMIN — Medication 650 MILLIGRAM(S): at 02:23

## 2022-05-08 NOTE — CONSULT NOTE ADULT - SUBJECTIVE AND OBJECTIVE BOX
Hospitalist note:    Reason for Admission: acute steven  History of Present Illness:   56F with PMH HTN, anxiety presents to the ED with epigastric pain and vomiting. Reports 10/10 RUQ pain radiating to LUQ, associated with nausea and one episode of vomiting. States this happened before on Monday but abd pain symptoms subsided on their own. Reports having pizza for dinner last night and several hours later noted sharp pain that would not subside prompting ED evaluation. Denies chest pain, palpitations, SOB. Denies fever, chills.    In the ED: temp 97.7, HR 86, /92, RR 18, Spo2 98% RA  Received IV Cipro, Flagyl, 1L NS bolus, Pepcid, Morphine, Zofran in the ED.  EKG personally reviewed: sinus tachycardia 111   Chest x-ray personally reviewed: no acute infiltrate   CT abd: Cholelithiasis. Suggestion of mild gallbladder wall edema.  No acute bowel inflammatory changes or obstruction. Detailed evaluation distal GI tract is limited secondary to inadequate distention. Mild diverticulosis coli.  RUQ US: Gallbladder is filled with stones and sludge with mild wall thickening.  Torres's sign is unreliable. Findings are equivocal for acute cholecystitis. No biliary dilatation. Fatty liver  Evaluated by surgery Dr Barajas, recommending admission and possible OR for lap steven on Monday.               patient interviewed and examined in geriatrics room 215      feels much better now      afebrile    Heent-sclera anicteric    abdomen-soft, non distended, non tender    labs:  Complete Blood Count in AM (05.08.22 @ 06:00)   Nucleated RBC: 0 /100 WBCs   WBC Count: 7.61 K/uL   RBC Count: 4.04 M/uL   Hemoglobin: 12.5 g/dL   Hematocrit: 38.2 %   Mean Cell Volume: 94.6 fl   Mean Cell Hemoglobin: 30.9 pg   Mean Cell Hemoglobin Conc: 32.7 gm/dL   Red Cell Distrib Width: 12.9 %   Platelet Count - Automated: 282 K/uL     k+3.8    creat 0.69    LFTs normal      imaging(I remotely reviewed last evening):  < from: US Abdomen Limited (05.07.22 @ 09:51) >    Gallbladder is filled with stones and sludge with mild wall thickening.   Torres's sign is unreliable. Findings are equivocal for acute   cholecystitis.  No biliary dilatation.    < end of copied text >  < from: CT Abdomen and Pelvis No Cont (05.07.22 @ 06:37) >  Cholelithiasis. Suggestion of mild gallbladder wall edema. Consider   correlation right upper quadrant ultrasound to exclude gallbladder   disease.    < end of copied text >

## 2022-05-08 NOTE — CONSULT NOTE ADULT - ASSESSMENT
a/p    symptomatic cholelithiasis    no worsening of symtoms today and afebriel, normal wbc, normal diff    I put on Operating Room add on schedule for Monday May 9, 2022 for laparoscopic cholectstectomy.  Procedure risks and benefits explained to patient.    May have full liquid diet today, NPO past midnight tonight.    Please encourage ambulation in hallway.    Thank you.    Dr. Rashad Barajas  cell#[phone]       How Severe Are Your Spot(S)?: mild Have Your Spot(S) Been Treated In The Past?: has not been treated Hpi Title: Evaluation of Skin Lesions Year Removed: 1900

## 2022-05-09 ENCOUNTER — RESULT REVIEW (OUTPATIENT)
Age: 57
End: 2022-05-09

## 2022-05-09 LAB
-  STAPHYLOCOCCUS EPIDERMIDIS: SIGNIFICANT CHANGE UP
ALBUMIN SERPL ELPH-MCNC: 3.2 G/DL — LOW (ref 3.3–5)
ALP SERPL-CCNC: 50 U/L — SIGNIFICANT CHANGE UP (ref 40–120)
ALT FLD-CCNC: 29 U/L — SIGNIFICANT CHANGE UP (ref 10–45)
ANION GAP SERPL CALC-SCNC: 8 MMOL/L — SIGNIFICANT CHANGE UP (ref 5–17)
AST SERPL-CCNC: 23 U/L — SIGNIFICANT CHANGE UP (ref 10–40)
BILIRUB SERPL-MCNC: 0.4 MG/DL — SIGNIFICANT CHANGE UP (ref 0.2–1.2)
BUN SERPL-MCNC: 12 MG/DL — SIGNIFICANT CHANGE UP (ref 7–23)
CALCIUM SERPL-MCNC: 8.9 MG/DL — SIGNIFICANT CHANGE UP (ref 8.4–10.5)
CHLORIDE SERPL-SCNC: 110 MMOL/L — HIGH (ref 96–108)
CO2 SERPL-SCNC: 24 MMOL/L — SIGNIFICANT CHANGE UP (ref 22–31)
CREAT SERPL-MCNC: 0.78 MG/DL — SIGNIFICANT CHANGE UP (ref 0.5–1.3)
EGFR: 88 ML/MIN/1.73M2 — SIGNIFICANT CHANGE UP
GLUCOSE SERPL-MCNC: 110 MG/DL — HIGH (ref 70–99)
GRAM STN FLD: SIGNIFICANT CHANGE UP
HCT VFR BLD CALC: 38.8 % — SIGNIFICANT CHANGE UP (ref 34.5–45)
HGB BLD-MCNC: 13.1 G/DL — SIGNIFICANT CHANGE UP (ref 11.5–15.5)
MAGNESIUM SERPL-MCNC: 1.9 MG/DL — SIGNIFICANT CHANGE UP (ref 1.6–2.6)
MCHC RBC-ENTMCNC: 31.3 PG — SIGNIFICANT CHANGE UP (ref 27–34)
MCHC RBC-ENTMCNC: 33.8 GM/DL — SIGNIFICANT CHANGE UP (ref 32–36)
MCV RBC AUTO: 92.8 FL — SIGNIFICANT CHANGE UP (ref 80–100)
METHOD TYPE: SIGNIFICANT CHANGE UP
NRBC # BLD: 0 /100 WBCS — SIGNIFICANT CHANGE UP (ref 0–0)
PLATELET # BLD AUTO: 257 K/UL — SIGNIFICANT CHANGE UP (ref 150–400)
POTASSIUM SERPL-MCNC: 4 MMOL/L — SIGNIFICANT CHANGE UP (ref 3.5–5.3)
POTASSIUM SERPL-SCNC: 4 MMOL/L — SIGNIFICANT CHANGE UP (ref 3.5–5.3)
PROT SERPL-MCNC: 6.7 G/DL — SIGNIFICANT CHANGE UP (ref 6–8.3)
RBC # BLD: 4.18 M/UL — SIGNIFICANT CHANGE UP (ref 3.8–5.2)
RBC # FLD: 12.9 % — SIGNIFICANT CHANGE UP (ref 10.3–14.5)
SODIUM SERPL-SCNC: 142 MMOL/L — SIGNIFICANT CHANGE UP (ref 135–145)
WBC # BLD: 6.69 K/UL — SIGNIFICANT CHANGE UP (ref 3.8–10.5)
WBC # FLD AUTO: 6.69 K/UL — SIGNIFICANT CHANGE UP (ref 3.8–10.5)

## 2022-05-09 PROCEDURE — 88304 TISSUE EXAM BY PATHOLOGIST: CPT | Mod: 26

## 2022-05-09 PROCEDURE — 47562 LAPAROSCOPIC CHOLECYSTECTOMY: CPT

## 2022-05-09 PROCEDURE — 99233 SBSQ HOSP IP/OBS HIGH 50: CPT

## 2022-05-09 DEVICE — HEMOSTAT ARISTA 3GR: Type: IMPLANTABLE DEVICE | Status: FUNCTIONAL

## 2022-05-09 DEVICE — CLIP APPLIER COVIDIEN ENDOCLIP 10MM LARGE: Type: IMPLANTABLE DEVICE | Status: FUNCTIONAL

## 2022-05-09 DEVICE — CLIP APPLIER ETHICON LIGACLIP 11.5" MEDIUM: Type: IMPLANTABLE DEVICE | Status: FUNCTIONAL

## 2022-05-09 DEVICE — CLIP APPLIER COVIDIEN ENDOCLIP 5MM: Type: IMPLANTABLE DEVICE | Status: FUNCTIONAL

## 2022-05-09 DEVICE — CLIP APPLIER COVIDIEN ENDOCLIP II 10MM MED/LG: Type: IMPLANTABLE DEVICE | Status: FUNCTIONAL

## 2022-05-09 DEVICE — TUBE T SILICONE 12FR: Type: IMPLANTABLE DEVICE | Status: FUNCTIONAL

## 2022-05-09 RX ORDER — HEPARIN SODIUM 5000 [USP'U]/ML
5000 INJECTION INTRAVENOUS; SUBCUTANEOUS EVERY 12 HOURS
Refills: 0 | Status: DISCONTINUED | OUTPATIENT
Start: 2022-05-09 | End: 2022-05-10

## 2022-05-09 RX ORDER — OXYCODONE HYDROCHLORIDE 5 MG/1
5 TABLET ORAL EVERY 6 HOURS
Refills: 0 | Status: DISCONTINUED | OUTPATIENT
Start: 2022-05-09 | End: 2022-05-10

## 2022-05-09 RX ORDER — CIPROFLOXACIN LACTATE 400MG/40ML
400 VIAL (ML) INTRAVENOUS ONCE
Refills: 0 | Status: COMPLETED | OUTPATIENT
Start: 2022-05-09 | End: 2022-05-09

## 2022-05-09 RX ORDER — SODIUM CHLORIDE 9 MG/ML
1000 INJECTION, SOLUTION INTRAVENOUS
Refills: 0 | Status: DISCONTINUED | OUTPATIENT
Start: 2022-05-09 | End: 2022-05-10

## 2022-05-09 RX ORDER — HYDROMORPHONE HYDROCHLORIDE 2 MG/ML
0.5 INJECTION INTRAMUSCULAR; INTRAVENOUS; SUBCUTANEOUS
Refills: 0 | Status: DISCONTINUED | OUTPATIENT
Start: 2022-05-09 | End: 2022-05-09

## 2022-05-09 RX ORDER — LANOLIN ALCOHOL/MO/W.PET/CERES
3 CREAM (GRAM) TOPICAL AT BEDTIME
Refills: 0 | Status: DISCONTINUED | OUTPATIENT
Start: 2022-05-09 | End: 2022-05-10

## 2022-05-09 RX ORDER — ACETAMINOPHEN 500 MG
650 TABLET ORAL EVERY 6 HOURS
Refills: 0 | Status: DISCONTINUED | OUTPATIENT
Start: 2022-05-09 | End: 2022-05-10

## 2022-05-09 RX ORDER — ONDANSETRON 8 MG/1
4 TABLET, FILM COATED ORAL EVERY 6 HOURS
Refills: 0 | Status: DISCONTINUED | OUTPATIENT
Start: 2022-05-09 | End: 2022-05-10

## 2022-05-09 RX ORDER — LISINOPRIL 2.5 MG/1
10 TABLET ORAL DAILY
Refills: 0 | Status: DISCONTINUED | OUTPATIENT
Start: 2022-05-09 | End: 2022-05-10

## 2022-05-09 RX ORDER — HEPARIN SODIUM 5000 [USP'U]/ML
5000 INJECTION INTRAVENOUS; SUBCUTANEOUS EVERY 12 HOURS
Refills: 0 | Status: DISCONTINUED | OUTPATIENT
Start: 2022-05-09 | End: 2022-05-09

## 2022-05-09 RX ORDER — HYDROMORPHONE HYDROCHLORIDE 2 MG/ML
0.25 INJECTION INTRAMUSCULAR; INTRAVENOUS; SUBCUTANEOUS EVERY 4 HOURS
Refills: 0 | Status: DISCONTINUED | OUTPATIENT
Start: 2022-05-09 | End: 2022-05-10

## 2022-05-09 RX ORDER — PANTOPRAZOLE SODIUM 20 MG/1
40 TABLET, DELAYED RELEASE ORAL
Refills: 0 | Status: DISCONTINUED | OUTPATIENT
Start: 2022-05-09 | End: 2022-05-10

## 2022-05-09 RX ORDER — METRONIDAZOLE 500 MG
500 TABLET ORAL ONCE
Refills: 0 | Status: COMPLETED | OUTPATIENT
Start: 2022-05-09 | End: 2022-05-09

## 2022-05-09 RX ORDER — ACETAMINOPHEN 500 MG
650 TABLET ORAL EVERY 6 HOURS
Refills: 0 | Status: DISCONTINUED | OUTPATIENT
Start: 2022-05-09 | End: 2022-05-09

## 2022-05-09 RX ORDER — SODIUM CHLORIDE 9 MG/ML
1000 INJECTION, SOLUTION INTRAVENOUS
Refills: 0 | Status: DISCONTINUED | OUTPATIENT
Start: 2022-05-09 | End: 2022-05-09

## 2022-05-09 RX ORDER — ONDANSETRON 8 MG/1
4 TABLET, FILM COATED ORAL ONCE
Refills: 0 | Status: DISCONTINUED | OUTPATIENT
Start: 2022-05-09 | End: 2022-05-09

## 2022-05-09 RX ADMIN — Medication 100 MILLIGRAM(S): at 05:39

## 2022-05-09 RX ADMIN — Medication 200 MILLIGRAM(S): at 07:13

## 2022-05-09 RX ADMIN — HYDROMORPHONE HYDROCHLORIDE 0.25 MILLIGRAM(S): 2 INJECTION INTRAMUSCULAR; INTRAVENOUS; SUBCUTANEOUS at 21:45

## 2022-05-09 RX ADMIN — Medication 200 MILLIGRAM(S): at 21:21

## 2022-05-09 RX ADMIN — HYDROMORPHONE HYDROCHLORIDE 0.5 MILLIGRAM(S): 2 INJECTION INTRAMUSCULAR; INTRAVENOUS; SUBCUTANEOUS at 18:10

## 2022-05-09 RX ADMIN — HYDROMORPHONE HYDROCHLORIDE 0.25 MILLIGRAM(S): 2 INJECTION INTRAMUSCULAR; INTRAVENOUS; SUBCUTANEOUS at 21:26

## 2022-05-09 RX ADMIN — HYDROMORPHONE HYDROCHLORIDE 0.5 MILLIGRAM(S): 2 INJECTION INTRAMUSCULAR; INTRAVENOUS; SUBCUTANEOUS at 17:55

## 2022-05-09 RX ADMIN — PANTOPRAZOLE SODIUM 40 MILLIGRAM(S): 20 TABLET, DELAYED RELEASE ORAL at 06:51

## 2022-05-09 RX ADMIN — Medication 100 MILLIGRAM(S): at 21:21

## 2022-05-09 RX ADMIN — LISINOPRIL 10 MILLIGRAM(S): 2.5 TABLET ORAL at 05:39

## 2022-05-09 NOTE — DIETITIAN INITIAL EVALUATION ADULT - OTHER INFO
pt. admitted w/ calculus of gallbaldder. scheduled for lap. steven. today. denies any n/v/ dairrhea. last bm was 5/6 ( lack of food). no edema noted. weight stable (obese).  has been tolerating the full liquid diet well. Skin intact. advance diet when medically appropriate to clear liquids, then low fat. advised weight loss. literature given. understanding is good. understands the need for weight loss. advised exercise as well. contact info. provided.

## 2022-05-09 NOTE — DIETITIAN NUTRITION RISK NOTIFICATION - TREATMENT: THE FOLLOWING DIET HAS BEEN RECOMMENDED
Diet, NPO after Midnight:      NPO Start Date: 08-May-2022,   NPO Start Time: 23:59 (05-08-22 @ 07:21) [Active]

## 2022-05-09 NOTE — PRE-OP CHECKLIST - HEIGHT IN INCHES
CARDIOLOGY TRANSFER OF CARE    Transfer of Care Date:  9/20/2021    Type of Transfer of Care:  Cardiology    Requesting Physician:  Dr. Brooks     Chief Complaint:  preop eval     History of Present Illness:  Patient is a 78 year old male with past medical history of CAD s/p CABG x 2, with LIMA to LAD and SVG to OM2 on 10/27/15, s/p PTA x2 to LM in 10/2019, ishemic cardiomyopathy with normalized EF per SARAY 11/2020 persistant AF s/p DCCV 2017, hypertension, COPD and hyperlipidemia who was admitted on 9/18/2021 with abdominal pain found to have SBO. ECG showing SR with PVCs and LBBB and troponin POC  <0.10    Pt states this morning he is feeling well. Tolerating CL diet. Denies any new or worsening chest pain or SOB. Able to climb one flight of stairs but is winded by the time he gets to the top which is not new.       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   • Chronic anticoagulation    • Chronic kidney disease    • Chronic pain     back pain   • COPD (chronic obstructive pulmonary disease) (CMS/HCC) 11/15/2012   • Coronary atherosclerosis of native coronary artery     Port   • Esophageal reflux    • Essential hypertension, benign    • Gout, unspecified    • HFrEF (heart failure with reduced ejection fraction) (CMS/[facility])    • Left bundle branch block (LBBB)    • Lumbar radiculopathy     s/p lumbar fusion    • Mixed hyperlipidemia    • Obese    • VALARIE (obstructive sleep apnea)    • Osteoarthrosis, unspecified whether generalized or localized, lower leg 3/6/2014    DDD knee   • Personal history of colonic polyps    • Retinal detachment     right eye   • S/P CABG x 2 10/27/2015   • Sinusitis, chronic    • SOBOE (shortness of breath on exertion) 7/2015    hosp. with dyspnea   • Unspecified hypothyroidism    • Wears  glasses    • Wears partial dentures     upper plate   • Wears partial dentures      Past Surgical History:   Procedure Laterality Date   • Cabg, artery-vein, single  10/27/15   • Cardiac catherization  10/7/15   • Cardiac event monitor  04/10-15/2016    6 day ACT monitor   • Colonoscopy  3/10/15   • Colonoscopy remove lesion by snare  11-    adenoma polyp, Dr Wilson f/u 5 yrs   • Echo limited  10/29/2015    LV EF- 40%   • Echo limited  01/27/2016    LV EF- 44%   • Echo m-mode/2d/doppler (routine)  12/12/2011    LVEF 45%   • Echo mady or  10/27/2015   • Eye surgery      retinal  tear surgery right eye   • Hand finger surgery unlisted      thumb pin   • Hand finger surgery unlisted      repair hand tendon   • Hernia repair      inguinal hernia as infant   • Laminectomy,lumbar  2002; 2010    and fusion, [facility] Yolande   • Removal of sperm duct(s)     • Stress test  12/12/2011, 9/15/15   • Tonsillectomy and adenoidectomy     • Total knee replacement Right 09/30/2014    Dr. Gutierrez. [facility]   • Total knee replacement Left 2005     ALLERGIES:   Allergen Reactions   • Cat Dander SHORTNESS OF BREATH     Watery eyes   • Dust Other (See Comments)     Watery eyes   • Grass Other (See Comments)     Watery eyes   • Trees Other (See Comments)     Watery eyes     Prior to Admission medications    Medication Sig Start Date End Date Taking? Authorizing Provider   lidocaine (Aspercreme Lidocaine) 4 % patch Place 1 patch onto the skin every 24 hours as needed for Pain.   Yes Outside Provider   losartan (COZAAR) 25 MG tablet TAKE 1 TABLET BY MOUTH  DAILY 8/31/21  Yes Dre Odom MD   clopidogrel (PLAVIX) 75 MG tablet TAKE 1 TABLET BY MOUTH  DAILY 8/31/21  Yes Dre Odom MD   metoPROLOL succinate (TOPROL-XL) 25 MG 24 hr tablet TAKE 1 TABLET BY MOUTH IN  THE MORNING AND 2 TABLETS  BY MOUTH IN THE EVENING 8/31/21  Yes Dre Odom MD   gabapentin (NEURONTIN) 100 MG capsule Take 1 capsule by mouth at bedtime.  Patient taking  differently: Take 100 mg by mouth at bedtime as needed. Indications: pain  8/24/21  Yes Eladio JIMENEZ MD   furosemide (LASIX) 20 MG tablet Take 0.5 tablets by mouth daily.  Patient taking differently: Take 10 mg by mouth daily as needed. Indications: Edema  8/3/21  Yes Dre Odom MD   levothyroxine 100 MCG tablet TAKE 1 TABLET BY MOUTH  DAILY 5/20/21  Yes Dora Sofia MD   simvastatin (ZOCOR) 10 MG tablet TAKE 1 TABLET BY MOUTH AT  NIGHT 5/20/21  Yes Dora Sofia MD   terazosin (HYTRIN) 1 MG capsule Sig: TAKE 2 CAPSULES NIGHTLY 5/5/21  Yes Dre Odom MD   pantoprazole (PROTONIX) 40 MG tablet TAKE 1 TABLET BY MOUTH  DAILY 4/26/21  Yes Dre Odom MD   allopurinol (ZYLOPRIM) 300 MG tablet Take 0.5 tablets by mouth daily. 4/22/21  Yes Dre Odom MD   fluticasone (FLONASE) 50 MCG/ACT nasal spray Use 1 spray in each nostril daily as needed for allergies  Patient taking differently: Spray 1 spray in each nostril daily.  4/22/21  Yes Dre Odom MD   nitroGLYcerin (NITROSTAT) 0.4 MG sublingual tablet Place 1 tablet under the tongue every 5 minutes as needed for Chest pain. 10/22/20  Yes Nader Rehman MD   fluticasone-salmeterol (ADVAIR, WIXELA) 250-50 MCG/DOSE inhaler Inhale 1 puff into the lungs two times daily.   Yes Outside Provider   acetaminophen (TYLENOL) 500 MG tablet Take 500 mg by mouth every 6 hours as needed for Pain.   Yes Outside Provider   albuterol 108 (90 Base) MCG/ACT inhaler Inhale 2 puffs into the lungs every 4 hours as needed for Shortness of Breath.  7/21/19  Yes Outside Provider   diclofenac (VOLTAREN) 1 % gel Apply 4 g topically 4 times daily. Apply to the hips as needed. 8/7/19  Yes Xavier Gutierrez MD   Multiple Vitamins-Minerals (MULTIVITAMIN ADULT) Tab Take 1 tablet by mouth daily (at noon).    Yes Outside Provider   Coenzyme Q10 (CO Q 10) 100 MG CAPS Take 1 tablet by mouth daily.     Yes Outside Provider   vitamin E 1000 UNIT capsule Take 1,000 Units by  mouth daily (at noon).    Yes Outside Provider   Cholecalciferol (VITAMIN D3) 1000 UNIT tablet Take 1,000-2,000 Units by mouth daily (at noon). Dose: 1-2 tablets (=1,000-2,000 units)   Yes Outside Provider   baclofen (LIORESAL) 10 MG tablet Take 0.5 tablets by mouth 3 times daily as needed (muscle spams). 21   Eladio JIMENEZ MD   finasteride (PROSCAR) 5 MG tablet Take 5 mg by mouth as needed. Indications: Benign Enlargement of Prostate    Outside Provider   fexofenadine (ALLEGRA) 180 MG tablet Take 180 mg by mouth as needed.  21  Outside Provider     Social History     Tobacco Use   • Smoking status: Former Smoker     Packs/day: 0.80     Years: 30.00     Pack years: 24.00     Types: Cigarettes     Quit date: 1987     Years since quittin.7   • Smokeless tobacco: Never Used   Substance Use Topics   • Alcohol use: Yes     Alcohol/week: 12.0 standard drinks     Types: 10 Cans of beer, 2 Standard drinks or equivalent per week     Comment: daily   beer/ dayo  2 drinks/night     Family History   Problem Relation Age of Onset   • Other Mother         age 82 old age   • Arthritis Mother    • Cancer Mother    • Alcohol Abuse Father    • Heart Father         age 41   • Diabetes Brother    • Hearing Loss Brother        Review of Systems:  10 systems reviewed and otherwise negative except for what is stated in past medical history and HPI (history of present illness).    Physical Exam:   Vitals:    21 0338   BP: (!) 148/84   Pulse: 85   Resp: 18   Temp: 98.4 °F (36.9 °C)       General Appearance:  alert  Eyes:  Anicteric Sclerae  Mouth:  Moist Mucous Membranes  Heart:  regular rate and rhythm  Lungs:  clear to auscultation bilaterally  Abdomen:  soft, non-tender; bowel sounds normal; no masses,  no organomegaly  Extremities:  no edema, redness or tenderness in the calves or thighs  Pulses:  +2 Bilateral Dorsalis Pedis  Musculoskeletal:  No tenderness over chest wall  Skin:  Warm and dry, no  rashes  Neuro:  Moves all 4 extremities equally, follows directions and answers questions appropriately  Psych:  Normal mood and affect    Data Review:    Lab Results   Component Value Date    SODIUM 127 (L) 09/20/2021    POTASSIUM 4.3 09/20/2021    BUN 19 09/20/2021    CREATININE 1.47 (H) 09/20/2021    WBC 9.1 09/20/2021    HCT 35.0 (L) 09/20/2021    HGB 11.8 (L) 09/20/2021     09/20/2021    INR 3.5 05/02/2016    RAPDTR 0.06 (HH) 10/08/2019    PTT 27 10/08/2019    GLUCOSE 111 (H) 09/20/2021    TSH 1.009 08/18/2021     (H) 06/05/2017    CHOLESTEROL 153 08/18/2021    HDL 90 08/18/2021    CALCLDL 52 08/18/2021    TRIGLYCERIDE 53 08/18/2021    MG 1.4 (L) 09/20/2021       Electrocardiogram:    SR with PVCs     Imaging:  SARAY 12/2020  1. Left ventricle is upper limit of normal in size with apical/septal  dyskinesia and otherwise normal wall motion. LVEF is 50%  2. Abnormal left ventricular diastolic function  3. Normal right ventricular size and systolic function  4. No previous study for comparison    ECHO 11/2020  Mildly increased left ventricular wall thickness. Left ventricular ejection fraction, 35 %.  Moderate global left ventricular hypokinesis. Abnormal septal motion consistent with conduction abnormality.  Moderately increased left atrial volume 39.6 ml/m².  Mild mitral valve regurgitation.  Mild tricuspid valve regurgitation. Right ventricular systolic pressure 30.9 mmHg.  No significant change since the prior study dated 03/20/2019.    [facility] 2019  1. Severe distal left main stenosis with unprotected large diagonal prior to LIMA anastomosis               -iFR positive at 0.80  2. Successful bifurcation stenting of distal left main.               -pre-dilation with a 3.5mm cutting balloon and a 4.5mm NC balloon (LM into LAD)               -Double barrel kissing technique used                            -3.0x22mm Resolute KEV placed extending into proximal LAD, final kiss post dilated with a 3.0 NC  balloon                            -2.5x15mm Resolute KEV placed extending into proximal LCx, final kiss post dilated with a 2.5 NC balloon  3. See Dr. Parisi report for full diagnostic study.  -ASA/Plavix    Assessment and Plan:  Cardiac risk assessment for possible SBO  - RCRI 0.9% risk of adverse cardiac events   - Denies any anginal equivalent sx. Able to perform 4 METS with SOB towards the end  - ECG without ischemic changes on admit. SARAY 12/2020 showing EF of 50%. ECHO ordered by primary team  - Plavix on hold for potential surgery. Start bASA, OK'd by surgical team. Continue BB, increase to 50 mg BID.     CAD s/p CABG 2015 and s/p balloon angioplasty x2 to left main 2019  - As above.     Ischemic Cardiomyopathy with low normal EF   - No overt HF on exam. Takes Lasix PRN PTA    Persistant atrial fibrillation s/p DCCV 2017  - Maintaining SR. Continue BB      Hypertension  - Elevated. ARB on hold 2/2 hyperkalemia. Increase BB     Hyperlipidemia   - Continue statin     Will discuss with Dr. Rehman.     SABRINA Calero/Dr. Rehman   Cardiology  Pager:  [phone]  9/20/2021  I have seen and examined the patient and I have discussed the care with my nurse practitioner and with the patient and I agree with the above content.  He is clinically stable with no cardiac symptoms. BB inceased due to persistent Htn. Pre-op risk assessment suggests he is at mildly increased cardiac and/or stroke risk and mildy increased 30day post op cardiac mortality.   At this point, he may not need surgery. Does NOT need an echo at this time. We have increased his BB due to persistent Htn and will follow    1

## 2022-05-09 NOTE — PROVIDER CONTACT NOTE (CRITICAL VALUE NOTIFICATION) - BACKGROUND
Pt is a 55 y/o who came with abdominal pain. Admit dx calculus of gallbladder without cholecystitis without obstruction.

## 2022-05-09 NOTE — DIETITIAN INITIAL EVALUATION ADULT - PERTINENT MEDS FT
MEDICATIONS  (STANDING):  ciprofloxacin   IVPB 400 milliGRAM(s) IV Intermittent every 12 hours  heparin   Injectable 5000 Unit(s) SubCutaneous every 12 hours  lisinopril 10 milliGRAM(s) Oral daily  metroNIDAZOLE  IVPB 500 milliGRAM(s) IV Intermittent every 8 hours  metroNIDAZOLE  IVPB 500 milliGRAM(s) IV Intermittent Once  pantoprazole    Tablet 40 milliGRAM(s) Oral before breakfast  sodium chloride 0.9%. 1000 milliLiter(s) (75 mL/Hr) IV Continuous <Continuous>    MEDICATIONS  (PRN):  acetaminophen     Tablet .. 650 milliGRAM(s) Oral every 6 hours PRN Temp greater or equal to 38C (100.4F), Mild Pain (1 - 3)  aluminum hydroxide/magnesium hydroxide/simethicone Suspension 30 milliLiter(s) Oral every 4 hours PRN Dyspepsia  melatonin 3 milliGRAM(s) Oral at bedtime PRN Insomnia  ondansetron Injectable 4 milliGRAM(s) IV Push every 6 hours PRN Nausea and/or Vomiting

## 2022-05-09 NOTE — DIETITIAN INITIAL EVALUATION ADULT - PERTINENT LABORATORY DATA
05-09    142  |  110<H>  |  12  ----------------------------<  110<H>  4.0   |  24  |  0.78    Ca    8.9      09 May 2022 06:40  Mg     1.9     05-09    TPro  6.7  /  Alb  3.2<L>  /  TBili  0.4  /  DBili  0.1  /  AST  23  /  ALT  29  /  AlkPhos  50  05-09

## 2022-05-09 NOTE — BRIEF OPERATIVE NOTE - OPERATION/FINDINGS
acutely inflammed gallbladder  critical view identified, cystic plate dissected  only two structures entering gallbladder

## 2022-05-09 NOTE — DIETITIAN INITIAL EVALUATION ADULT - REASON INDICATOR FOR ASSESSMENT
Health Maintenance Due   Topic Date Due    Lipid Panel  1980    Pap Smear with HPV Cotest  11/25/2001    Influenza Vaccine  08/01/2018       
BMI > 40

## 2022-05-10 ENCOUNTER — TRANSCRIPTION ENCOUNTER (OUTPATIENT)
Age: 57
End: 2022-05-10

## 2022-05-10 VITALS
DIASTOLIC BLOOD PRESSURE: 96 MMHG | TEMPERATURE: 99 F | OXYGEN SATURATION: 94 % | RESPIRATION RATE: 16 BRPM | SYSTOLIC BLOOD PRESSURE: 136 MMHG | HEART RATE: 102 BPM

## 2022-05-10 DIAGNOSIS — R10.9 UNSPECIFIED ABDOMINAL PAIN: ICD-10-CM

## 2022-05-10 LAB — GRAM STN FLD: SIGNIFICANT CHANGE UP

## 2022-05-10 PROCEDURE — 87077 CULTURE AEROBIC IDENTIFY: CPT

## 2022-05-10 PROCEDURE — 85025 COMPLETE CBC W/AUTO DIFF WBC: CPT

## 2022-05-10 PROCEDURE — 99233 SBSQ HOSP IP/OBS HIGH 50: CPT

## 2022-05-10 PROCEDURE — 85610 PROTHROMBIN TIME: CPT

## 2022-05-10 PROCEDURE — 74176 CT ABD & PELVIS W/O CONTRAST: CPT | Mod: MA

## 2022-05-10 PROCEDURE — 86850 RBC ANTIBODY SCREEN: CPT

## 2022-05-10 PROCEDURE — 86900 BLOOD TYPING SEROLOGIC ABO: CPT

## 2022-05-10 PROCEDURE — 99285 EMERGENCY DEPT VISIT HI MDM: CPT

## 2022-05-10 PROCEDURE — 93005 ELECTROCARDIOGRAM TRACING: CPT

## 2022-05-10 PROCEDURE — 84484 ASSAY OF TROPONIN QUANT: CPT

## 2022-05-10 PROCEDURE — 96375 TX/PRO/DX INJ NEW DRUG ADDON: CPT

## 2022-05-10 PROCEDURE — C1889: CPT

## 2022-05-10 PROCEDURE — 86901 BLOOD TYPING SEROLOGIC RH(D): CPT

## 2022-05-10 PROCEDURE — 84702 CHORIONIC GONADOTROPIN TEST: CPT

## 2022-05-10 PROCEDURE — 80053 COMPREHEN METABOLIC PANEL: CPT

## 2022-05-10 PROCEDURE — 85379 FIBRIN DEGRADATION QUANT: CPT

## 2022-05-10 PROCEDURE — 85730 THROMBOPLASTIN TIME PARTIAL: CPT

## 2022-05-10 PROCEDURE — 83605 ASSAY OF LACTIC ACID: CPT

## 2022-05-10 PROCEDURE — 88304 TISSUE EXAM BY PATHOLOGIST: CPT

## 2022-05-10 PROCEDURE — 36415 COLL VENOUS BLD VENIPUNCTURE: CPT

## 2022-05-10 PROCEDURE — 86803 HEPATITIS C AB TEST: CPT

## 2022-05-10 PROCEDURE — 85027 COMPLETE CBC AUTOMATED: CPT

## 2022-05-10 PROCEDURE — 82248 BILIRUBIN DIRECT: CPT

## 2022-05-10 PROCEDURE — 83690 ASSAY OF LIPASE: CPT

## 2022-05-10 PROCEDURE — 71045 X-RAY EXAM CHEST 1 VIEW: CPT

## 2022-05-10 PROCEDURE — 83735 ASSAY OF MAGNESIUM: CPT

## 2022-05-10 PROCEDURE — 87635 SARS-COV-2 COVID-19 AMP PRB: CPT

## 2022-05-10 PROCEDURE — 76705 ECHO EXAM OF ABDOMEN: CPT

## 2022-05-10 PROCEDURE — 87150 DNA/RNA AMPLIFIED PROBE: CPT

## 2022-05-10 PROCEDURE — 81001 URINALYSIS AUTO W/SCOPE: CPT

## 2022-05-10 PROCEDURE — 96365 THER/PROPH/DIAG IV INF INIT: CPT

## 2022-05-10 PROCEDURE — 87040 BLOOD CULTURE FOR BACTERIA: CPT

## 2022-05-10 RX ORDER — ACETAMINOPHEN 500 MG
2 TABLET ORAL
Qty: 0 | Refills: 0 | DISCHARGE
Start: 2022-05-10

## 2022-05-10 RX ORDER — OXYCODONE HYDROCHLORIDE 5 MG/1
1 TABLET ORAL
Qty: 3 | Refills: 0
Start: 2022-05-10 | End: 2022-05-10

## 2022-05-10 RX ADMIN — LISINOPRIL 10 MILLIGRAM(S): 2.5 TABLET ORAL at 05:28

## 2022-05-10 RX ADMIN — HEPARIN SODIUM 5000 UNIT(S): 5000 INJECTION INTRAVENOUS; SUBCUTANEOUS at 05:30

## 2022-05-10 RX ADMIN — PANTOPRAZOLE SODIUM 40 MILLIGRAM(S): 20 TABLET, DELAYED RELEASE ORAL at 05:29

## 2022-05-10 NOTE — DISCHARGE NOTE NURSING/CASE MANAGEMENT/SOCIAL WORK - PATIENT PORTAL LINK FT
You can access the FollowMyHealth Patient Portal offered by [facility] by registering at the following website: [email or web address]. By joining [employer]’s FollowMyHealth portal, you will also be able to view your health information using other applications (apps) compatible with our system.

## 2022-05-10 NOTE — DISCHARGE NOTE PROVIDER - NSDCMRMEDTOKEN_GEN_ALL_CORE_FT
acetaminophen 325 mg oral tablet: 2 tab(s) orally every 6 hours, As needed, Temp greater or equal to 38C (100.4F), Mild Pain (1 - 3)  lisinopril 10 mg oral tablet: 1 tab(s) orally once a day  oxyCODONE 5 mg oral tablet: 1 tab(s) orally every 8 hours, As Needed -for severe pain MDD:3

## 2022-05-10 NOTE — DISCHARGE NOTE PROVIDER - NSDCCPCAREPLAN_GEN_ALL_CORE_FT
PRINCIPAL DISCHARGE DIAGNOSIS  Diagnosis: Acute cholecystitis  Assessment and Plan of Treatment:       SECONDARY DISCHARGE DIAGNOSES  Diagnosis: Gallstones  Assessment and Plan of Treatment:

## 2022-05-10 NOTE — DISCHARGE NOTE PROVIDER - HOSPITAL COURSE
HPI:  56F with PMH HTN, anxiety presents to the ED with epigastric pain and vomiting. Reports 10/10 RUQ pain radiating to LUQ, associated with nausea and one episode of vomiting. States this happened before on Monday but abd pain symptoms subsided on their own. Reports having pizza for dinner last night and several hours later noted sharp pain that would not subside prompting ED evaluation. Denies chest pain, palpitations, SOB. Denies fever, chills.    In the ED: temp 97.7, HR 86, /92, RR 18, Spo2 98% RA  Received IV Cipro, Flagyl, 1L NS bolus, Pepcid, Morphine, Zofran in the ED.  EKG personally reviewed: sinus tachycardia 111   Chest x-ray personally reviewed: no acute infiltrate   CT abd: Cholelithiasis. Suggestion of mild gallbladder wall edema.  No acute bowel inflammatory changes or obstruction. Detailed evaluation distal GI tract is limited secondary to inadequate distention. Mild diverticulosis coli.  RUQ US: Gallbladder is filled with stones and sludge with mild wall thickening.  Torres's sign is unreliable. Findings are equivocal for acute cholecystitis. No biliary dilatation. Fatty liver  Evaluated by surgery Dr Barajas, recommending admission and possible OR for lap steven on Monday.  (07 May 2022 11:29)    Pt has laparascopic cholecystectomy on 5/9/22. Pt tolerated procedure well. admitted. no acute events overnight. remainder of hospital course uneventful.  Pt tolerating reg diet. +flatus.

## 2022-05-10 NOTE — PROGRESS NOTE ADULT - ASSESSMENT
56F with PMH HTN, anxiety presents to the ED with epigastric pain and vomiting admitted for acute cholecystitis.     # Symptomatic Cholelithiasis  US GB showed Gallbladder filled with stones and sludge with mild wall thickening  surgery evaluation appreciated  continue cipro and flagy  NPO for lap steven with surgery planned for today  pain management, antiemetics, protonix    # Tachycardia  likely related to above  continue gentle IVF, encourage po hydration  monitor vitals    # HTN  continue lisinopril  monitor vitals    # Prophylactic Measure  SCDs, hold chemical ppx for OR    Patient states she will update family members on her own.     
a/p    doing well s/p laparoscopic cholecystectomy    can discharge home today on two days of pain meds and followup in my office([phone]) next week.    thank you  dr rai rivera  cell#[phone]
56F with PMH HTN, anxiety presents to the ED with epigastric pain and vomiting admitted for acute cholecystitis.     # Symptomatic Cholelithiasis  US GB showed Gallbladder filled with stones and sludge with mild wall thickening  Surgery consult-POD # 1 s/p lap steven  Pain control  Advance diet to regular     # HTN  continue lisinopril  monitor vitals      5/10:Patient states she will update family members on her own.   Dispo: home today with outpatient follow up with Dr Barajas    **Case dw Dr Barajas   
56F with PMH HTN, anxiety presents to the ED with epigastric pain and vomiting admitted for acute cholecystitis.     # Symptomatic Cholelithiasis  US GB showed Gallbladder filled with stones and sludge with mild wall thickening  surgery evaluation appreciated  continue cipro and flagy  maintain clears for today, continue IVF  npo after midnight for lap steven tomorrow with surgery  encourage ambulation in hallway  pain management, antiemetics, protonix    # Tachycardia  likely related to above  continue gentle IVF, encourage po hydration  monitor vitals    # HTN  continue lisinopril  monitor vitals    # Prophylactic Measure  SCDs, hold chemical ppx for OR    Patient states she will update family members on her own.

## 2022-05-10 NOTE — DISCHARGE NOTE PROVIDER - CARE PROVIDER_API CALL
Rashad Barajas)  Surgery  [address], [address]  [place] [address]  Phone: [phone]  Fax: [phone]  Follow Up Time: 1 week

## 2022-05-10 NOTE — PROGRESS NOTE ADULT - SUBJECTIVE AND OBJECTIVE BOX
Patient is a 56y old  Female who presents with a chief complaint of acute steven (08 May 2022 07:23)    Patient seen and examined at bedside.  no acute events overnight    ALLERGIES:  No Known Allergies        Vital Signs Last 24 Hrs  T(F): 98.3 (08 May 2022 06:10), Max: 99.1 (07 May 2022 19:42)  HR: 85 (08 May 2022 06:10) (85 - 104)  BP: 120/78 (08 May 2022 06:10) (120/78 - 148/104)  RR: 13 (08 May 2022 06:10) (13 - 18)  SpO2: 98% (08 May 2022 06:10) (98% - 100%)  I&O's Summary    07 May 2022 07:01  -  08 May 2022 07:00  --------------------------------------------------------  IN: 1290 mL / OUT: 0 mL / NET: 1290 mL      MEDICATIONS:  acetaminophen     Tablet .. 650 milliGRAM(s) Oral every 6 hours PRN  aluminum hydroxide/magnesium hydroxide/simethicone Suspension 30 milliLiter(s) Oral every 4 hours PRN  ciprofloxacin   IVPB 400 milliGRAM(s) IV Intermittent every 12 hours  lisinopril 10 milliGRAM(s) Oral daily  melatonin 3 milliGRAM(s) Oral at bedtime PRN  metroNIDAZOLE  IVPB 500 milliGRAM(s) IV Intermittent every 8 hours  metroNIDAZOLE  IVPB 500 milliGRAM(s) IV Intermittent Once  ondansetron Injectable 4 milliGRAM(s) IV Push every 6 hours PRN  pantoprazole    Tablet 40 milliGRAM(s) Oral before breakfast  sodium chloride 0.9%. 1000 milliLiter(s) IV Continuous <Continuous>      PHYSICAL EXAM:  General: NAD, A/O x 3  ENT: MMM, no thrush  Neck: Supple, No JVD  Lungs: Clear to auscultation bilaterally, non labored  Cardio: S1S2 regular  Abdomen: Soft, Nontender  Extremities: No cyanosis, No edema    LABS:                        12.5   7.61  )-----------( 282      ( 08 May 2022 06:00 )             38.2     05-08    140  |  109  |  13  ----------------------------<  102  3.8   |  24  |  0.69    Ca    9.2      08 May 2022 06:00    TPro  6.8  /  Alb  3.2  /  TBili  0.4  /  DBili  x   /  AST  15  /  ALT  25  /  AlkPhos  51  05-08      PT/INR - ( 07 May 2022 11:45 )   PT: 11.2 sec;   INR: 0.97 ratio         PTT - ( 07 May 2022 11:45 )  PTT:31.7 sec  Lactate, Blood: 1.3 mmol/L ( @ 11:45)    CARDIAC MARKERS ( 07 May 2022 06:45 )  x     / 9.0 ng/L / x     / x     / x      CARDIAC MARKERS ( 07 May 2022 05:40 )  x     / 7.9 ng/L / x     / x     / x                            Urinalysis Basic - ( 07 May 2022 07:30 )    Color: Yellow / Appearance: Clear / S.015 / pH: x  Gluc: x / Ketone: Negative  / Bili: Negative / Urobili: Negative   Blood: x / Protein: 15 / Nitrite: Negative   Leuk Esterase: Negative / RBC: 0-4 /HPF / WBC Negative /HPF   Sq Epi: x / Non Sq Epi: Neg.-Few / Bacteria: Negative /HPF        COVID-19 PCR: NotDetec (22 @ 11:45)      RADIOLOGY & ADDITIONAL TESTS:    < from: CT Abdomen and Pelvis No Cont (22 @ 06:37) >  IMPRESSION:    Cholelithiasis. Suggestion of mild gallbladder wall edema. Consider   correlation right upper quadrant ultrasound to exclude gallbladder   disease.    No acute bowel inflammatory changes orobstruction.detailed evaluation   distal GI tract is limited secondary to inadequate distention.    Mild diverticulosis coli.    Additional findings as mentioned above.    < end of copied text >    < from: US Abdomen Limited (22 @ 09:51) >  IMPRESSION:    Gallbladder is filled with stones and sludge with mild wall thickening.   Torres's sign is unreliable. Findings are equivocal for acute   cholecystitis.  No biliary dilatation.  Fatty liver    --- End of Report ---    < end of copied text >    Care Discussed with Consultants/Other Providers:   
POD#1    afebrile    vitals stable    feels "great"    Heent-sclera anicteric    abdomen-soft, non distended, non tender, trocar sites clean and dry and no hernias      
Patient is a 56y old  Female who presents with a chief complaint of acute steven (10 May 2022 08:25)  Pain controlled  Tolerating full liquid diet  Denies nausea or vomitting    Patient seen and examined at bedside.    ALLERGIES:  No Known Allergies    MEDICATIONS  (STANDING):  heparin   Injectable 5000 Unit(s) SubCutaneous every 12 hours  lisinopril 10 milliGRAM(s) Oral daily  pantoprazole    Tablet 40 milliGRAM(s) Oral before breakfast    MEDICATIONS  (PRN):  acetaminophen     Tablet .. 650 milliGRAM(s) Oral every 6 hours PRN Temp greater or equal to 38C (100.4F), Mild Pain (1 - 3)  aluminum hydroxide/magnesium hydroxide/simethicone Suspension 30 milliLiter(s) Oral every 4 hours PRN Dyspepsia  HYDROmorphone  Injectable 0.25 milliGRAM(s) IV Push every 4 hours PRN Severe Pain (7 - 10)  melatonin 3 milliGRAM(s) Oral at bedtime PRN Insomnia  ondansetron Injectable 4 milliGRAM(s) IV Push every 6 hours PRN Nausea and/or Vomiting  oxyCODONE    IR 5 milliGRAM(s) Oral every 6 hours PRN Moderate Pain (4 - 6)    Vital Signs Last 24 Hrs  T(F): 98.6 (10 May 2022 08:27), Max: 98.8 (10 May 2022 05:04)  HR: 102 (10 May 2022 08:27) (98 - 110)  BP: 136/96 (10 May 2022 08:27) (122/72 - 150/94)  RR: 16 (10 May 2022 08:27) (14 - 17)  SpO2: 94% (10 May 2022 08:27) (92% - 99%)  I&O's Summary    09 May 2022 07:01  -  10 May 2022 07:00  --------------------------------------------------------  IN: 705 mL / OUT: 0 mL / NET: 705 mL    10 May 2022 07:01  -  10 May 2022 10:01  --------------------------------------------------------  IN: 240 mL / OUT: 0 mL / NET: 240 mL      BMI (kg/m2): 46.1 (05-09-22 @ 09:25)  PHYSICAL EXAM:  General: NAD, A/O x 3  ENT: MMM, no thrush  Neck: Supple, No JVD  Lungs: Non labored breathing,  Clear to auscultation bilaterally,   Cardio: RRR, S1/S2,, no pitting edema bilaterally  Abdomen: Soft, surgical site c/d/i, Nondistended; Bowel sounds present  Extremities: No calf tenderness, moves all extremities    LABS:                        13.1   6.69  )-----------( 257      ( 09 May 2022 06:40 )             38.8       05-09    142  |  110  |  12  ----------------------------<  110  4.0   |  24  |  0.78    Ca    8.9      09 May 2022 06:40  Mg     1.9     05-09    TPro  6.7  /  Alb  3.2  /  TBili  0.4  /  DBili  0.1  /  AST  23  /  ALT  29  /  AlkPhos  50  05-09       PT/INR - ( 07 May 2022 11:45 )   PT: 11.2 sec;   INR: 0.97 ratio         PTT - ( 07 May 2022 11:45 )  PTT:31.7 sec   Lactate, Blood: 1.3 mmol/L (05-07 @ 11:45)                              Culture - Blood (collected 07 May 2022 11:45)  Source: .Blood Blood-Peripheral  Preliminary Report (08 May 2022 23:02):    No growth to date.    Culture - Blood (collected 07 May 2022 11:20)  Source: .Blood Blood-Peripheral  Gram Stain (10 May 2022 06:50):    Growth in anaerobic bottle: Gram Positive Cocci in Clusters    Growth in aerobic bottle: Gram Positive Cocci in Clusters  Preliminary Report (10 May 2022 06:50):    Growth in aerobic bottle: Gram Positive Cocci in Clusters    Growth in anaerobic bottle: Gram Positive Cocci in Clusters    ***Blood Panel PCR results on this specimen are available    approximately 3 hours after the Gram stain result.***    Gram stain, PCR, and/or culture results may not always    correspond due to difference in methodologies.    ************************************************************    This PCR assay was performed by multiplex PCR. This    Assay tests for 66 bacterial and resistance gene targets.    Please refer to the [facility] test directory    at [email or web address] for details.  Organism: Blood Culture PCR (09 May 2022 09:07)  Organism: Blood Culture PCR (09 May 2022 09:07)      -  Staphylococcus epidermidis: Detec      Method Type: PCR      COVID-19 PCR: NotDetec (05-07-22 @ 11:45)      RADIOLOGY & ADDITIONAL TESTS:    Care Discussed with Consultants/Other Providers:   
Patient is a 56y old  Female who presents with a chief complaint of Calculus of gallbladder without cholecystitis without obstruction      Patient seen and examined at bedside.  no acute events overnight    ALLERGIES:  No Known Allergies        Vital Signs Last 24 Hrs  T(F): 97.3 (09 May 2022 08:40), Max: 98.5 (08 May 2022 19:30)  HR: 87 (09 May 2022 08:40) (85 - 87)  BP: 125/79 (09 May 2022 08:40) (125/79 - 143/85)  RR: 12 (09 May 2022 08:40) (12 - 17)  SpO2: 96% (09 May 2022 08:40) (96% - 100%)  I&O's Summary    08 May 2022 07:01  -  09 May 2022 07:00  --------------------------------------------------------  IN: 2865 mL / OUT: 0 mL / NET: 2865 mL    09 May 2022 07:01  -  09 May 2022 10:56  --------------------------------------------------------  IN: 100 mL / OUT: 0 mL / NET: 100 mL      MEDICATIONS:  acetaminophen     Tablet .. 650 milliGRAM(s) Oral every 6 hours PRN  aluminum hydroxide/magnesium hydroxide/simethicone Suspension 30 milliLiter(s) Oral every 4 hours PRN  ciprofloxacin   IVPB 400 milliGRAM(s) IV Intermittent every 12 hours  heparin   Injectable 5000 Unit(s) SubCutaneous every 12 hours  lisinopril 10 milliGRAM(s) Oral daily  melatonin 3 milliGRAM(s) Oral at bedtime PRN  metroNIDAZOLE  IVPB 500 milliGRAM(s) IV Intermittent every 8 hours  metroNIDAZOLE  IVPB 500 milliGRAM(s) IV Intermittent Once  ondansetron Injectable 4 milliGRAM(s) IV Push every 6 hours PRN  pantoprazole    Tablet 40 milliGRAM(s) Oral before breakfast  sodium chloride 0.9%. 1000 milliLiter(s) IV Continuous <Continuous>      PHYSICAL EXAM:  General: NAD, A/O x 3  ENT: MMM, no thrush  Neck: Supple, No JVD  Lungs: Clear to auscultation bilaterally, non labored  Cardio: S1S2 regular  Abdomen: Soft, Nontender  Extremities: No cyanosis, No edema    LABS:                        13.1   6.69  )-----------( 257      ( 09 May 2022 06:40 )             38.8         142  |  110  |  12  ----------------------------<  110  4.0   |  24  |  0.78    Ca    8.9      09 May 2022 06:40  Mg     1.9         TPro  6.7  /  Alb  3.2  /  TBili  0.4  /  DBili  0.1  /  AST  23  /  ALT  29  /  AlkPhos  50        PT/INR - ( 07 May 2022 11:45 )   PT: 11.2 sec;   INR: 0.97 ratio         PTT - ( 07 May 2022 11:45 )  PTT:31.7 sec  Lactate, Blood: 1.3 mmol/L ( @ 11:45)    CARDIAC MARKERS ( 07 May 2022 06:45 )  x     / 9.0 ng/L / x     / x     / x      CARDIAC MARKERS ( 07 May 2022 05:40 )  x     / 7.9 ng/L / x     / x     / x                            Urinalysis Basic - ( 07 May 2022 07:30 )    Color: Yellow / Appearance: Clear / S.015 / pH: x  Gluc: x / Ketone: Negative  / Bili: Negative / Urobili: Negative   Blood: x / Protein: 15 / Nitrite: Negative   Leuk Esterase: Negative / RBC: 0-4 /HPF / WBC Negative /HPF   Sq Epi: x / Non Sq Epi: Neg.-Few / Bacteria: Negative /HPF        Culture - Blood (collected 07 May 2022 11:45)  Source: .Blood Blood-Peripheral  Preliminary Report (08 May 2022 23:02):    No growth to date.    Culture - Blood (collected 07 May 2022 11:20)  Source: .Blood Blood-Peripheral  Gram Stain (09 May 2022 06:33):    Growth in anaerobic bottle: Gram Positive Cocci in Clusters  Preliminary Report (09 May 2022 06:33):    Growth in anaerobic bottle: Gram Positive Cocci in Clusters    ***Blood Panel PCR results on this specimen are available    approximately 3 hours after the Gram stain result.***    Gram stain, PCR, and/or culture results may not always    correspond due todifference in methodologies.    ************************************************************    This PCR assay was performed by multiplex PCR. This    Assay tests for 66 bacterial and resistance gene targets.    Please refer to the [facility] testdirectory    at [email or web address] for details.  Organism: Blood Culture PCR (09 May 2022 09:07)  Organism: Blood Culture PCR (09 May 2022 09:07)      -  Staphylococcus epidermidis: Detec      Method Type: PCR      COVID-19 PCR: NotDetec (22 @ 11:45)      RADIOLOGY & ADDITIONAL TESTS:    Care Discussed with Consultants/Other Providers:

## 2022-05-10 NOTE — DISCHARGE NOTE PROVIDER - DETAILS OF MALNUTRITION DIAGNOSIS/DIAGNOSES
This patient has been assessed with a concern for Malnutrition and was treated during this hospitalization for the following Nutrition diagnosis/diagnoses:     -  05/09/2022: Morbid obesity (BMI > 40)

## 2022-05-10 NOTE — PROGRESS NOTE ADULT - NS ATTEND AMEND GEN_ALL_CORE FT
55 y/o F with PMH HTN, anxiety presents to the ED with epigastric pain and vomiting admitted for acute cholecystitis. Surgery appreciated. continue IV abx. NPO for OR today
56F with PMH HTN, anxiety presents to the ED with epigastric pain and vomiting admitted for acute cholecystitis. Surgery appreciated. continue IV abx. CLD - advance as tolerated. Cont IVF. pain improved today.
d/w Dr. Barajas, PA [place]    s/p cholecystectomy POD1. Passing flatus.   DC home

## 2022-05-10 NOTE — DISCHARGE NOTE NURSING/CASE MANAGEMENT/SOCIAL WORK - NSDCPEFALRISK_GEN_ALL_CORE
For information on Fall & Injury Prevention, visit: [email or web address] OR  [email or web address] OR  https://www.cdc.gov/steadi/patient.html

## 2022-05-10 NOTE — PROGRESS NOTE ADULT - NUTRITIONAL ASSESSMENT
This patient has been assessed with a concern for Malnutrition and has been determined to have a diagnosis/diagnoses of Morbid obesity (BMI > 40).    This patient is being managed with:   Diet DASH/TLC-  Sodium & Cholesterol Restricted  Entered: May 10 2022  9:03AM    
advance to regular

## 2022-05-11 PROBLEM — F41.9 ANXIETY DISORDER, UNSPECIFIED: Chronic | Status: ACTIVE | Noted: 2022-05-07

## 2022-05-11 PROBLEM — I10 ESSENTIAL (PRIMARY) HYPERTENSION: Chronic | Status: ACTIVE | Noted: 2022-05-07

## 2022-05-11 LAB
CULTURE RESULTS: SIGNIFICANT CHANGE UP
ORGANISM # SPEC MICROSCOPIC CNT: SIGNIFICANT CHANGE UP
ORGANISM # SPEC MICROSCOPIC CNT: SIGNIFICANT CHANGE UP
SPECIMEN SOURCE: SIGNIFICANT CHANGE UP

## 2022-05-12 LAB
CULTURE RESULTS: SIGNIFICANT CHANGE UP
SPECIMEN SOURCE: SIGNIFICANT CHANGE UP

## 2022-05-19 ENCOUNTER — APPOINTMENT (OUTPATIENT)
Dept: SURGERY | Facility: CLINIC | Age: 57
End: 2022-05-19
Payer: COMMERCIAL

## 2022-05-19 VITALS
WEIGHT: 210 LBS | TEMPERATURE: 97.8 F | HEIGHT: 61 IN | DIASTOLIC BLOOD PRESSURE: 78 MMHG | HEART RATE: 90 BPM | OXYGEN SATURATION: 97 % | BODY MASS INDEX: 39.65 KG/M2 | SYSTOLIC BLOOD PRESSURE: 126 MMHG

## 2022-05-19 DIAGNOSIS — K81.0 ACUTE CHOLECYSTITIS: ICD-10-CM

## 2022-05-19 PROCEDURE — 99024 POSTOP FOLLOW-UP VISIT: CPT

## 2022-05-20 LAB — SURGICAL PATHOLOGY STUDY: SIGNIFICANT CHANGE UP

## 2022-05-25 ENCOUNTER — NON-APPOINTMENT (OUTPATIENT)
Age: 57
End: 2022-05-25

## 2022-07-11 ENCOUNTER — RX RENEWAL (OUTPATIENT)
Age: 57
End: 2022-07-11

## 2022-07-11 RX ORDER — LISINOPRIL 10 MG/1
10 TABLET ORAL
Qty: 30 | Refills: 0 | Status: ACTIVE | COMMUNITY
Start: 2021-07-12 | End: 1900-01-01

## 2022-08-01 ENCOUNTER — APPOINTMENT (OUTPATIENT)
Dept: MAMMOGRAPHY | Facility: HOSPITAL | Age: 57
End: 2022-08-01

## 2022-08-01 ENCOUNTER — OUTPATIENT (OUTPATIENT)
Dept: OUTPATIENT SERVICES | Facility: HOSPITAL | Age: 57
LOS: 1 days | End: 2022-08-01
Payer: COMMERCIAL

## 2022-08-01 ENCOUNTER — APPOINTMENT (OUTPATIENT)
Dept: ULTRASOUND IMAGING | Facility: HOSPITAL | Age: 57
End: 2022-08-01

## 2022-08-01 DIAGNOSIS — Z98.891 HISTORY OF UTERINE SCAR FROM PREVIOUS SURGERY: Chronic | ICD-10-CM

## 2022-08-01 DIAGNOSIS — Z00.8 ENCOUNTER FOR OTHER GENERAL EXAMINATION: ICD-10-CM

## 2022-08-01 PROCEDURE — 76641 ULTRASOUND BREAST COMPLETE: CPT | Mod: 26,50

## 2022-08-01 PROCEDURE — 77063 BREAST TOMOSYNTHESIS BI: CPT

## 2022-08-01 PROCEDURE — 77067 SCR MAMMO BI INCL CAD: CPT | Mod: 26

## 2022-08-01 PROCEDURE — 76830 TRANSVAGINAL US NON-OB: CPT | Mod: 26

## 2022-08-01 PROCEDURE — 77067 SCR MAMMO BI INCL CAD: CPT

## 2022-08-01 PROCEDURE — 76856 US EXAM PELVIC COMPLETE: CPT | Mod: 26,59

## 2022-08-01 PROCEDURE — 76830 TRANSVAGINAL US NON-OB: CPT

## 2022-08-01 PROCEDURE — 77063 BREAST TOMOSYNTHESIS BI: CPT | Mod: 26

## 2022-08-01 PROCEDURE — 76856 US EXAM PELVIC COMPLETE: CPT

## 2022-08-01 PROCEDURE — 76641 ULTRASOUND BREAST COMPLETE: CPT

## 2022-08-27 ENCOUNTER — APPOINTMENT (OUTPATIENT)
Dept: MRI IMAGING | Facility: CLINIC | Age: 57
End: 2022-08-27

## 2023-04-20 ENCOUNTER — NON-APPOINTMENT (OUTPATIENT)
Age: 58
End: 2023-04-20

## 2023-04-20 DIAGNOSIS — Z92.89 PERSONAL HISTORY OF OTHER MEDICAL TREATMENT: ICD-10-CM

## 2023-04-20 DIAGNOSIS — Z78.9 OTHER SPECIFIED HEALTH STATUS: ICD-10-CM

## 2023-04-20 DIAGNOSIS — Z98.890 OTHER SPECIFIED POSTPROCEDURAL STATES: ICD-10-CM

## 2023-04-20 DIAGNOSIS — D25.9 LEIOMYOMA OF UTERUS, UNSPECIFIED: ICD-10-CM

## 2024-03-16 NOTE — DISCHARGE NOTE PROVIDER - PROVIDER TOKENS
Paula reports high blood pressure /98 at 1228. Rechecked /90 at 1351. Saw PCP 4 days ago prescribed steroid and antibiotic for sinusitis, bronchitis & pinched nerve. Pt states she woke up feeling lightheaded this morning which is not present now but intermittent throughout the day. Pt reports slight tightness in chest is present. Advised pt per triage protocol to go to nearest ED now for physician lawrence. Instructed to call  now if no immediate . V/u.   Reason for Disposition   [1] Systolic BP  >= 160 OR Diastolic >= 100 AND [2] cardiac (e.g., breathing difficulty, chest pain) or neurologic symptoms (e.g., new-onset blurred or double vision, unsteady gait)    Additional Information   Negative: Difficult to awaken or acting confused (e.g., disoriented, slurred speech)   Negative: SEVERE difficulty breathing (e.g., struggling for each breath, speaks in single words)   Negative: [1] Weakness of the face, arm or leg on one side of the body AND [2] new-onset   Negative: [1] Numbness (i.e., loss of sensation) of the face, arm or leg on one side of the body AND [2] new-onset   Negative: [1] Chest pain lasts > 5 minutes AND [2] history of heart disease (i.e., heart attack, bypass surgery, angina, angioplasty, CHF)   Negative: [1] Chest pain AND [2] took nitrogylcerin AND [3] pain was not relieved   Negative: Sounds like a life-threatening emergency to the triager    Protocols used: Blood Pressure - High-A-AH    
PROVIDER:[TOKEN:[[id number]],FOLLOWUP:[1 week]]

## 2025-02-11 NOTE — H&P ADULT - NSVTERISKASSESS_GEN_ALL_CORE FT
Patient's HM shows they are overdue for Colorectal Screening.   Care Everywhere and  files searched.  No results to attach to order nor HM updated.      
Medical Assessment Completed on: 07-May-2022 13:20

## 2025-04-23 NOTE — PATIENT PROFILE ADULT - INTERNATIONAL TRAVEL
Patient states she received a text from [employer] that Lisinopril is no longer covered by insurance. Patient would like an alternative prescription sent.  Pharmacy attached and verified   No

## (undated) DEVICE — CANISTER SUCTION LID GUARD 3000CC

## (undated) DEVICE — TROCAR COVIDIEN VERSASTEP PLUS 11MM

## (undated) DEVICE — GLV 8 PROTEXIS

## (undated) DEVICE — NDL COVIDIEN 14G INSUFFLATION NEEDLE

## (undated) DEVICE — SOLIDIFIER CANN EXPRESS 3K

## (undated) DEVICE — DISSECTOR ENDO PEANUT 5MM

## (undated) DEVICE — POSITIONER FOAM HEAD CRADLE

## (undated) DEVICE — TROCAR COVIDIEN VERSAPORT PLUS 11MM STRAIGHT

## (undated) DEVICE — GLV 7.5 PROTEXIS

## (undated) DEVICE — GOWN XL

## (undated) DEVICE — SOL IRR POUR NS 0.9% 500ML

## (undated) DEVICE — TROCAR COVIDIEN VERSASTEP PLUS 12MM W SLEEVE

## (undated) DEVICE — Device

## (undated) DEVICE — PRESSURE INFUSOR BAG 1000ML

## (undated) DEVICE — PACK MINOR

## (undated) DEVICE — SCOPE WARMER SEAL DISP

## (undated) DEVICE — SUT POLYSORB 0 30" GS-23

## (undated) DEVICE — WRAP COMPRESSION CALF MED

## (undated) DEVICE — TUBING HYDRO-SURG PLUS IRRIGATOR W SMOKEVAC & PROBE

## (undated) DEVICE — GLV 6.5 PROTEXIS

## (undated) DEVICE — ENDOCATCH 10MM SPECIMEN POUCH

## (undated) DEVICE — BLANKET WARMER UPPER ADULT

## (undated) DEVICE — DRAPE LIGHT HANDLE COVER FLEX GREEN

## (undated) DEVICE — TUBING W FILTER STERILE FOR INSUFFLATION

## (undated) DEVICE — DRSG STERISTRIPS 0.5X4"

## (undated) DEVICE — TUBING IV EXTENSION 30"

## (undated) DEVICE — SOL IRR POUR H2O 1500ML

## (undated) DEVICE — SPONGE LAP X-RAY DETECTABLE 18X18"

## (undated) DEVICE — TROCAR COVIDIEN VERSASTEP 5MM

## (undated) DEVICE — TUBING INSUFLATOR VIDEO TOWER NON HEATED

## (undated) DEVICE — POSITIONER STRAP ARMBOARD VELCRO TS-30 12

## (undated) DEVICE — GLV COTTON DEROYAL XL

## (undated) DEVICE — PREP CHLORAPREP ORANGE 2PCT 26ML

## (undated) DEVICE — APPLICATOR ARISTA FLEXI TIP XL 38CM

## (undated) DEVICE — CONTAINER SPECIMEN PET

## (undated) DEVICE — GLV 8.5 PROTEXIS